# Patient Record
Sex: MALE | Race: WHITE | NOT HISPANIC OR LATINO | Employment: OTHER | URBAN - METROPOLITAN AREA
[De-identification: names, ages, dates, MRNs, and addresses within clinical notes are randomized per-mention and may not be internally consistent; named-entity substitution may affect disease eponyms.]

---

## 2017-10-23 ENCOUNTER — OFFICE VISIT (OUTPATIENT)
Dept: FAMILY MEDICINE CLINIC | Facility: CLINIC | Age: 42
End: 2017-10-23

## 2017-10-23 VITALS
HEART RATE: 72 BPM | DIASTOLIC BLOOD PRESSURE: 88 MMHG | OXYGEN SATURATION: 97 % | BODY MASS INDEX: 36.31 KG/M2 | HEIGHT: 73 IN | SYSTOLIC BLOOD PRESSURE: 140 MMHG | WEIGHT: 274 LBS

## 2017-10-23 DIAGNOSIS — N48.89 EPIDERMOID CYST OF SKIN OF PENIS: Primary | ICD-10-CM

## 2017-10-23 PROCEDURE — 99213 OFFICE O/P EST LOW 20 MIN: CPT | Performed by: NURSE PRACTITIONER

## 2017-10-23 NOTE — PROGRESS NOTES
"Sung Hernandez is a 42 y.o. male.Patient states that he found a lump on his penis about four days ago. The area has decreased in size. The area was red and swollen.It is much better now and he has never had any pain.  Seen 10/23/2017    Assessment/Plan   Problem List Items Addressed This Visit     None      Visit Diagnoses     Epidermoid cyst of skin of penis    -  Primary             No Follow-up on file.  There are no Patient Instructions on file for this visit.    Subjective     Chief Complaint   Patient presents with   • Mass     Social History   Substance Use Topics   • Smoking status: Never Smoker   • Smokeless tobacco: Never Used   • Alcohol use 3.6 oz/week     3 Glasses of wine, 3 Shots of liquor per week      Comment: socially       History of Present Illness     The following portions of the patient's history were reviewed and updated as appropriate:PMHroutine: Social history , Allergies and Current Medications    Review of Systems   Constitutional: Negative for activity change.   Genitourinary: Negative for difficulty urinating, discharge, genital sores, hematuria, penile pain, penile swelling and scrotal swelling.       Objective   Vitals:    10/23/17 1306   BP: 140/88   Pulse: 72   SpO2: 97%   Weight: 274 lb (124 kg)   Height: 73\" (185.4 cm)     Body mass index is 36.15 kg/(m^2).  Physical Exam   Constitutional: He appears well-developed and well-nourished.   HENT:   Head: Normocephalic and atraumatic.   Pulmonary/Chest: Effort normal.   Genitourinary: Penis normal.   Genitourinary Comments: Has a very superfical pink pinpoint dot to glans with no swelling or pain.   Musculoskeletal: Normal range of motion.   Nursing note and vitals reviewed.    Reviewed Data:  No visits with results within 1 Month(s) from this visit.  Latest known visit with results is:    Office Visit on 12/15/2016   Component Date Value Ref Range Status   • Rapid Strep A Screen 12/15/2016 Negative  Negative, VALID, INVALID, Not " Performed Final   • Internal Control 12/15/2016 Passed  Passed Final   • Lot Number 12/15/2016 [id number]   Final   • Expiration Date 12/15/2016 6/2018   Final

## 2018-01-19 ENCOUNTER — OFFICE VISIT (OUTPATIENT)
Dept: FAMILY MEDICINE CLINIC | Facility: CLINIC | Age: 43
End: 2018-01-19

## 2018-01-19 DIAGNOSIS — B02.9 HERPES ZOSTER WITHOUT COMPLICATION: Primary | ICD-10-CM

## 2018-01-19 PROCEDURE — 99213 OFFICE O/P EST LOW 20 MIN: CPT | Performed by: FAMILY MEDICINE

## 2018-01-19 RX ORDER — VALACYCLOVIR HYDROCHLORIDE 1 G/1
1000 TABLET, FILM COATED ORAL 3 TIMES DAILY
Qty: 21 TABLET | Refills: 0 | Status: SHIPPED | OUTPATIENT
Start: 2018-01-19 | End: 2018-01-26

## 2018-01-19 RX ORDER — EPINEPHRINE 0.3 MG/.3ML
INJECTION SUBCUTANEOUS
COMMUNITY
End: 2018-05-25

## 2018-01-19 NOTE — PROGRESS NOTES
Subjective   Sung Hernandez is a 42 y.o. male.     History of Present Illness   Sung is here accompanied by his wife.  He has had itching of his Rt eye and forehead for several days.  He has some swollen glands, headache and neck pain as well.  Rash began about 3 days ago.    The following portions of the patient's history were reviewed and updated as appropriate: allergies, current medications, past medical history, past social history and problem list.    Review of Systems   Skin: Positive for rash.   All other systems reviewed and are negative.      Objective   Physical Exam   Constitutional: He appears well-developed.   Cardiovascular: Normal rate.    Skin:   Papular rash on  Right forehead and right side of scal(   Vitals reviewed.      Assessment/Plan   Diagnoses and all orders for this visit:    Herpes zoster without complication  -     valACYclovir (VALTREX) 1000 MG tablet; Take 1 tablet by mouth 3 (Three) Times a Day for 7 days.    I have advised OTC meds for discomfort and patient handout given.

## 2018-01-19 NOTE — PATIENT INSTRUCTIONS
Shingles  Shingles, which is also known as herpes zoster, is an infection that causes a painful skin rash and fluid-filled blisters. Shingles is not related to genital herpes, which is a sexually transmitted infection.  Shingles only develops in people who:  · Have had chickenpox.  · Have received the chickenpox vaccine. (This is rare.)  What are the causes?  Shingles is caused by varicella-zoster virus (VZV). This is the same virus that causes chickenpox. After exposure to VZV, the virus stays in the body in an inactive (dormant) state. Shingles develops if the virus reactivates. This can happen many years after the initial exposure to VZV. It is not known what causes this virus to reactivate.  What increases the risk?  People who have had chickenpox or received the chickenpox vaccine are at risk for shingles. Infection is more common in people who:  · Are older than age 50.  · Have a weakened defense (immune) system, such as those with HIV, AIDS, or cancer.  · Are taking medicines that weaken the immune system, such as transplant medicines.  · Are under great stress.  What are the signs or symptoms?  Early symptoms of this condition include itching, tingling, and pain in an area on your skin. Pain may be described as burning, stabbing, or throbbing.  A few days or weeks after symptoms start, a painful red rash appears, usually on one side of the body in a bandlike or beltlike pattern. The rash eventually turns into fluid-filled blisters that break open, scab over, and dry up in about 2-3 weeks.  At any time during the infection, you may also develop:  · A fever.  · Chills.  · A headache.  · An upset stomach.  How is this diagnosed?  This condition is diagnosed with a skin exam. Sometimes, skin or fluid samples are taken from the blisters before a diagnosis is made. These samples are examined under a microscope or sent to a lab for testing.  How is this treated?  There is no specific cure for this condition. Your  health care provider will probably prescribe medicines to help you manage pain, recover more quickly, and avoid long-term problems. Medicines may include:  · Antiviral drugs.  · Anti-inflammatory drugs.  · Pain medicines.  If the area involved is on your face, you may be referred to a specialist, such as an eye doctor (ophthalmologist) or an ear, nose, and throat (ENT) doctor to help you avoid eye problems, chronic pain, or disability.  Follow these instructions at home:  Medicines  · Take medicines only as directed by your health care provider.  · Apply an anti-itch or numbing cream to the affected area as directed by your health care provider.  Blister and Rash Care  · Take a cool bath or apply cool compresses to the area of the rash or blisters as directed by your health care provider. This may help with pain and itching.  · Keep your rash covered with a loose bandage (dressing). Wear loose-fitting clothing to help ease the pain of material rubbing against the rash.  · Keep your rash and blisters clean with mild soap and cool water or as directed by your health care provider.  · Check your rash every day for signs of infection. These include redness, swelling, and pain that lasts or increases.  · Do not pick your blisters.  · Do not scratch your rash.  General instructions  · Rest as directed by your health care provider.  · Keep all follow-up visits as directed by your health care provider. This is important.  · Until your blisters scab over, your infection can cause chickenpox in people who have never had it or been vaccinated against it. To prevent this from happening, avoid contact with other people, especially:  ¨ Babies.  ¨ Pregnant women.  ¨ Children who have eczema.  ¨ Elderly people who have transplants.  ¨ People who have chronic illnesses, such as leukemia or AIDS.  Contact a health care provider if:  · Your pain is not relieved with prescribed medicines.  · Your pain does not get better after the rash  heals.  · Your rash looks infected. Signs of infection include redness, swelling, and pain that lasts or increases.  Get help right away if:  · The rash is on your face or nose.  · You have facial pain, pain around your eye area, or loss of feeling on one side of your face.  · You have ear pain or you have ringing in your ear.  · You have loss of taste.  · Your condition gets worse.  This information is not intended to replace advice given to you by your health care provider. Make sure you discuss any questions you have with your health care provider.  Document Released: 12/18/2006 Document Revised: 08/13/2017 Document Reviewed: 10/29/2015  Elsevier Interactive Patient Education © 2017 [employer]

## 2018-05-25 ENCOUNTER — APPOINTMENT (OUTPATIENT)
Dept: MRI IMAGING | Facility: HOSPITAL | Age: 43
End: 2018-05-25

## 2018-05-25 ENCOUNTER — HOSPITAL ENCOUNTER (OUTPATIENT)
Facility: HOSPITAL | Age: 43
Setting detail: OBSERVATION
LOS: 1 days | Discharge: HOME | End: 2018-05-26
Attending: EMERGENCY MEDICINE | Admitting: INTERNAL MEDICINE

## 2018-05-25 ENCOUNTER — APPOINTMENT (OUTPATIENT)
Dept: CT IMAGING | Facility: HOSPITAL | Age: 43
End: 2018-05-25

## 2018-05-25 ENCOUNTER — APPOINTMENT (OUTPATIENT)
Dept: GENERAL RADIOLOGY | Facility: HOSPITAL | Age: 43
End: 2018-05-25

## 2018-05-25 DIAGNOSIS — R26.81 UNSTEADY GAIT: ICD-10-CM

## 2018-05-25 DIAGNOSIS — R07.9 ACUTE CHEST PAIN: Primary | ICD-10-CM

## 2018-05-25 DIAGNOSIS — R13.12 OROPHARYNGEAL DYSPHAGIA: ICD-10-CM

## 2018-05-25 LAB
ALBUMIN SERPL-MCNC: 4.6 G/DL (ref 3.5–5.2)
ALBUMIN/GLOB SERPL: 1.5 G/DL
ALP SERPL-CCNC: 50 U/L (ref 39–117)
ALT SERPL W P-5'-P-CCNC: 23 U/L (ref 1–41)
ANION GAP SERPL CALCULATED.3IONS-SCNC: 10.7 MMOL/L
AST SERPL-CCNC: 22 U/L (ref 1–40)
BASOPHILS # BLD AUTO: 0.02 10*3/MM3 (ref 0–0.2)
BASOPHILS NFR BLD AUTO: 0.3 % (ref 0–1.5)
BILIRUB SERPL-MCNC: 0.7 MG/DL (ref 0.1–1.2)
BUN BLD-MCNC: 17 MG/DL (ref 6–20)
BUN/CREAT SERPL: 15.6 (ref 7–25)
CALCIUM SPEC-SCNC: 10 MG/DL (ref 8.6–10.5)
CHLORIDE SERPL-SCNC: 102 MMOL/L (ref 98–107)
CO2 SERPL-SCNC: 28.3 MMOL/L (ref 22–29)
CREAT BLD-MCNC: 1.09 MG/DL (ref 0.76–1.27)
DEPRECATED RDW RBC AUTO: 42.4 FL (ref 37–54)
EOSINOPHIL # BLD AUTO: 0.11 10*3/MM3 (ref 0–0.7)
EOSINOPHIL NFR BLD AUTO: 1.6 % (ref 0.3–6.2)
ERYTHROCYTE [DISTWIDTH] IN BLOOD BY AUTOMATED COUNT: 12.5 % (ref 11.5–14.5)
GFR SERPL CREATININE-BSD FRML MDRD: 74 ML/MIN/1.73
GLOBULIN UR ELPH-MCNC: 3 GM/DL
GLUCOSE BLD-MCNC: 102 MG/DL (ref 65–99)
HCT VFR BLD AUTO: 48.9 % (ref 40.4–52.2)
HGB BLD-MCNC: 16.7 G/DL (ref 13.7–17.6)
HOLD SPECIMEN: NORMAL
HOLD SPECIMEN: NORMAL
IMM GRANULOCYTES # BLD: 0.03 10*3/MM3 (ref 0–0.03)
IMM GRANULOCYTES NFR BLD: 0.4 % (ref 0–0.5)
LYMPHOCYTES # BLD AUTO: 2.27 10*3/MM3 (ref 0.9–4.8)
LYMPHOCYTES NFR BLD AUTO: 32.9 % (ref 19.6–45.3)
MCH RBC QN AUTO: 31.9 PG (ref 27–32.7)
MCHC RBC AUTO-ENTMCNC: 34.2 G/DL (ref 32.6–36.4)
MCV RBC AUTO: 93.3 FL (ref 79.8–96.2)
MONOCYTES # BLD AUTO: 0.51 10*3/MM3 (ref 0.2–1.2)
MONOCYTES NFR BLD AUTO: 7.4 % (ref 5–12)
NEUTROPHILS # BLD AUTO: 4 10*3/MM3 (ref 1.9–8.1)
NEUTROPHILS NFR BLD AUTO: 57.8 % (ref 42.7–76)
PLATELET # BLD AUTO: 134 10*3/MM3 (ref 140–500)
PMV BLD AUTO: 11.1 FL (ref 6–12)
POTASSIUM BLD-SCNC: 5 MMOL/L (ref 3.5–5.2)
PROT SERPL-MCNC: 7.6 G/DL (ref 6–8.5)
RBC # BLD AUTO: 5.24 10*6/MM3 (ref 4.6–6)
SODIUM BLD-SCNC: 141 MMOL/L (ref 136–145)
TROPONIN T SERPL-MCNC: <0.01 NG/ML (ref 0–0.03)
TROPONIN T SERPL-MCNC: <0.01 NG/ML (ref 0–0.03)
WBC NRBC COR # BLD: 6.91 10*3/MM3 (ref 4.5–10.7)
WHOLE BLOOD HOLD SPECIMEN: NORMAL
WHOLE BLOOD HOLD SPECIMEN: NORMAL

## 2018-05-25 PROCEDURE — G0378 HOSPITAL OBSERVATION PER HR: HCPCS

## 2018-05-25 PROCEDURE — 70544 MR ANGIOGRAPHY HEAD W/O DYE: CPT

## 2018-05-25 PROCEDURE — A9577 INJ MULTIHANCE: HCPCS | Performed by: INTERNAL MEDICINE

## 2018-05-25 PROCEDURE — 85025 COMPLETE CBC W/AUTO DIFF WBC: CPT

## 2018-05-25 PROCEDURE — 70549 MR ANGIOGRAPH NECK W/O&W/DYE: CPT

## 2018-05-25 PROCEDURE — 84484 ASSAY OF TROPONIN QUANT: CPT | Performed by: EMERGENCY MEDICINE

## 2018-05-25 PROCEDURE — 84484 ASSAY OF TROPONIN QUANT: CPT

## 2018-05-25 PROCEDURE — 99285 EMERGENCY DEPT VISIT HI MDM: CPT

## 2018-05-25 PROCEDURE — 93005 ELECTROCARDIOGRAM TRACING: CPT | Performed by: EMERGENCY MEDICINE

## 2018-05-25 PROCEDURE — 36415 COLL VENOUS BLD VENIPUNCTURE: CPT

## 2018-05-25 PROCEDURE — 93005 ELECTROCARDIOGRAM TRACING: CPT

## 2018-05-25 PROCEDURE — 70551 MRI BRAIN STEM W/O DYE: CPT

## 2018-05-25 PROCEDURE — 80053 COMPREHEN METABOLIC PANEL: CPT

## 2018-05-25 PROCEDURE — 93010 ELECTROCARDIOGRAM REPORT: CPT | Performed by: INTERNAL MEDICINE

## 2018-05-25 PROCEDURE — 0 GADOBENATE DIMEGLUMINE 529 MG/ML SOLUTION: Performed by: INTERNAL MEDICINE

## 2018-05-25 PROCEDURE — 71046 X-RAY EXAM CHEST 2 VIEWS: CPT

## 2018-05-25 PROCEDURE — 70450 CT HEAD/BRAIN W/O DYE: CPT

## 2018-05-25 RX ORDER — ACETAMINOPHEN 325 MG/1
650 TABLET ORAL EVERY 4 HOURS PRN
Status: DISCONTINUED | OUTPATIENT
Start: 2018-05-25 | End: 2018-05-26 | Stop reason: HOSPADM

## 2018-05-25 RX ORDER — MULTIVITAMIN WITH IRON
1 TABLET ORAL 2 TIMES DAILY
COMMUNITY

## 2018-05-25 RX ORDER — SODIUM CHLORIDE 9 MG/ML
75 INJECTION, SOLUTION INTRAVENOUS CONTINUOUS
Status: DISCONTINUED | OUTPATIENT
Start: 2018-05-25 | End: 2018-05-26 | Stop reason: HOSPADM

## 2018-05-25 RX ORDER — ONDANSETRON 2 MG/ML
4 INJECTION INTRAMUSCULAR; INTRAVENOUS EVERY 6 HOURS PRN
Status: DISCONTINUED | OUTPATIENT
Start: 2018-05-25 | End: 2018-05-26 | Stop reason: HOSPADM

## 2018-05-25 RX ORDER — SODIUM CHLORIDE 0.9 % (FLUSH) 0.9 %
1-10 SYRINGE (ML) INJECTION AS NEEDED
Status: DISCONTINUED | OUTPATIENT
Start: 2018-05-25 | End: 2018-05-26 | Stop reason: HOSPADM

## 2018-05-25 RX ORDER — ACETAMINOPHEN 650 MG/1
650 SUPPOSITORY RECTAL EVERY 4 HOURS PRN
Status: DISCONTINUED | OUTPATIENT
Start: 2018-05-25 | End: 2018-05-26 | Stop reason: HOSPADM

## 2018-05-25 RX ORDER — ASPIRIN 325 MG
325 TABLET ORAL ONCE
Status: COMPLETED | OUTPATIENT
Start: 2018-05-25 | End: 2018-05-25

## 2018-05-25 RX ORDER — PANTOPRAZOLE SODIUM 40 MG/1
40 TABLET, DELAYED RELEASE ORAL EVERY MORNING
Status: DISCONTINUED | OUTPATIENT
Start: 2018-05-26 | End: 2018-05-26 | Stop reason: HOSPADM

## 2018-05-25 RX ORDER — ACETAMINOPHEN 325 MG/1
650 TABLET ORAL ONCE
Status: COMPLETED | OUTPATIENT
Start: 2018-05-25 | End: 2018-05-25

## 2018-05-25 RX ORDER — CETIRIZINE HYDROCHLORIDE 10 MG/1
10 TABLET ORAL DAILY
Status: DISCONTINUED | OUTPATIENT
Start: 2018-05-26 | End: 2018-05-26 | Stop reason: HOSPADM

## 2018-05-25 RX ORDER — DESVENLAFAXINE SUCCINATE 50 MG/1
50 TABLET, EXTENDED RELEASE ORAL DAILY
Status: DISCONTINUED | OUTPATIENT
Start: 2018-05-26 | End: 2018-05-26 | Stop reason: HOSPADM

## 2018-05-25 RX ORDER — ALBUTEROL SULFATE 2.5 MG/3ML
2.5 SOLUTION RESPIRATORY (INHALATION) EVERY 6 HOURS PRN
Status: DISCONTINUED | OUTPATIENT
Start: 2018-05-25 | End: 2018-05-26 | Stop reason: HOSPADM

## 2018-05-25 RX ORDER — EPINEPHRINE 0.3 MG/.3ML
0.3 INJECTION SUBCUTANEOUS AS NEEDED
COMMUNITY

## 2018-05-25 RX ORDER — ATORVASTATIN CALCIUM 80 MG/1
80 TABLET, FILM COATED ORAL NIGHTLY
Status: DISCONTINUED | OUTPATIENT
Start: 2018-05-25 | End: 2018-05-26 | Stop reason: HOSPADM

## 2018-05-25 RX ORDER — MAGNESIUM OXIDE 400 MG/1
200 TABLET ORAL DAILY
Status: DISCONTINUED | OUTPATIENT
Start: 2018-05-26 | End: 2018-05-26 | Stop reason: HOSPADM

## 2018-05-25 RX ORDER — DESVENLAFAXINE SUCCINATE 50 MG/1
50 TABLET, EXTENDED RELEASE ORAL 2 TIMES DAILY
COMMUNITY

## 2018-05-25 RX ORDER — ASPIRIN 325 MG
325 TABLET ORAL DAILY
Status: DISCONTINUED | OUTPATIENT
Start: 2018-05-26 | End: 2018-05-26 | Stop reason: HOSPADM

## 2018-05-25 RX ORDER — SODIUM CHLORIDE 0.9 % (FLUSH) 0.9 %
10 SYRINGE (ML) INJECTION AS NEEDED
Status: DISCONTINUED | OUTPATIENT
Start: 2018-05-25 | End: 2018-05-26 | Stop reason: HOSPADM

## 2018-05-25 RX ORDER — MONTELUKAST SODIUM 10 MG/1
10 TABLET ORAL NIGHTLY
Status: DISCONTINUED | OUTPATIENT
Start: 2018-05-25 | End: 2018-05-26 | Stop reason: HOSPADM

## 2018-05-25 RX ADMIN — TRAZODONE HYDROCHLORIDE 150 MG: 100 TABLET, FILM COATED ORAL at 23:39

## 2018-05-25 RX ADMIN — ATORVASTATIN CALCIUM 80 MG: 80 TABLET, FILM COATED ORAL at 23:40

## 2018-05-25 RX ADMIN — MONTELUKAST 10 MG: 10 TABLET, FILM COATED ORAL at 23:40

## 2018-05-25 RX ADMIN — SODIUM CHLORIDE 75 ML/HR: 9 INJECTION, SOLUTION INTRAVENOUS at 23:37

## 2018-05-25 RX ADMIN — ASPIRIN 325 MG: 325 TABLET ORAL at 16:08

## 2018-05-25 RX ADMIN — GADOBENATE DIMEGLUMINE 20 ML: 529 INJECTION, SOLUTION INTRAVENOUS at 23:15

## 2018-05-25 RX ADMIN — ACETAMINOPHEN 650 MG: 325 TABLET ORAL at 20:48

## 2018-05-25 NOTE — PROGRESS NOTES
Clinical Pharmacy Services: Medication History    Sung Hernandez is a 43 y.o. male presenting to [facility] for   Chief Complaint   Patient presents with   • Chest Pain     pressure started yest       He  has a past medical history of Acid reflux disease; Anxiety and depression; Asthma; Hearing loss; Hiatal hernia; History of concussion (1994); History of migraine headaches; Low vitamin B12 level; Osteoarthritis of both knees; and Seasonal allergies.    Allergies as of 05/25/2018   • (No Known Allergies)       Medication information was obtained from: Patient  Pharmacy and Phone Number: [phone] [facility]    Prior to Admission Medications     Prescriptions Last Dose Informant Patient Reported? Taking?    desvenlafaxine (PRISTIQ) 50 MG 24 hr tablet 5/25/2018 Self Yes Yes    Take 50 mg by mouth 2 (Two) Times a Day.    EPINEPHrine (EPIPEN) 0.3 MG/0.3ML solution auto-injector injection  Self Yes Yes    Inject 0.3 mg under the skin As Needed.    fexofenadine (ALLEGRA) 180 MG tablet 5/24/2018 Self Yes Yes    Take 180 mg by mouth Every Night.    Magnesium 250 MG tablet  Self Yes Yes    Take 1 tablet by mouth 2 (Two) Times a Day.    montelukast (SINGULAIR) 10 MG tablet 5/24/2018 Self Yes Yes    Take 10 mg by mouth every night.    omeprazole (priLOSEC) 40 MG capsule 5/25/2018 Self Yes Yes    Take 40 mg by mouth Daily.    Potassium 99 MG tablet  Self Yes Yes    Take 1 tablet by mouth daily.    PROAIR  (90 Base) MCG/ACT inhaler  Self Yes Yes    Inhale 2 puffs Every 4 (Four) Hours As Needed.    traZODone (DESYREL) 100 MG tablet 5/24/2018 Self Yes Yes    Take 150 mg by mouth Every Night.            Medication notes: None    This medication list is complete to the best of my knowledge as of 5/25/2018    Please call if questions.  Dandy Muller  5/25/2018 5:54 PM

## 2018-05-25 NOTE — ED PROVIDER NOTES
EMERGENCY DEPARTMENT ENCOUNTER    CHIEF COMPLAINT  Chief Complaint: chest pressure  History given by: patient  History limited by: n/a  Room Number:   PMD: ALYSON Buitrago      HPI:  Pt is a 43 y.o. male who presents complaining of 2 episodes of chest pressure which occurred yesterday and today upon exertion.  Pt also c/o SOA, lightheadedness, and 'off balance' gait with exertion during the episodes.  Pt reports the episode yesterday began after lifting weights and rowing and was constant throughout the night.  Pt reports he was feeling better this morning so he exercised again, which caused another episode this morning around 1115. Pt further describes his 'off balance' gait by reporting unsteadiness with ambulation and 'fine motor skill' issue when attaching a strap to a machine, but states he has been otherwise neurologically intact.  Pt reports FHx of MI at a young age.  Pt denies hx of HTN or hyperlipidemia.  Pt denies use of stimulants.  Pt rates his chest pressure at 1/10 currently.      Duration:  1 day  Onset: gradual  Timin episodes  Location: chest  Radiation: none  Quality: pressure  Intensity/Severity: moderate  Progression: improving  Associated Symptoms: SOA, lightheadedness,unsteady gait  Aggravating Factors: exertion  Alleviating Factors: none stated  Previous Episodes: none stated  Treatment before arrival: none    PAST MEDICAL HISTORY  Active Ambulatory Problems     Diagnosis Date Noted   • Asthma    • Osteoarthritis of both knees    • Hiatal hernia    • Hearing loss    • Acid reflux disease    • Anxiety and depression    • History of migraine headaches    • Low vitamin B12 level    • Seasonal allergies    • Hx of migraines 08/15/2016     Resolved Ambulatory Problems     Diagnosis Date Noted   • No Resolved Ambulatory Problems     Past Medical History:   Diagnosis Date   • Acid reflux disease    • Anxiety and depression    • Asthma    • Hearing loss    • Hiatal hernia    • History of  concussion 1994   • History of migraine headaches    • Low vitamin B12 level    • Osteoarthritis of both knees    • Seasonal allergies        PAST SURGICAL HISTORY  Past Surgical History:   Procedure Laterality Date   • APPENDECTOMY  1994,2006   • GALLBLADDER SURGERY  2006   • KIDNEY STONE SURGERY     • KNEE ARTHROTOMY Right 6/21/2016    Procedure: RT KNEE OPEN REMOVAL OSTEOCHONDROME W/ PERONEAL NERVE DECOMPRESSION;  Surgeon: HENRIETTA Chisholm MD;  Location: [facility] OR [facility] [place];  Service:    • PATELLA SURGERY Left 1989       FAMILY HISTORY  Family History   Problem Relation Age of Onset   • Congenital heart disease Father    • Coronary artery disease Father    • Heart disease Paternal Uncle    • Brain cancer Maternal Grandmother    • Lung cancer Maternal Grandfather    • Heart disease Paternal Grandfather        SOCIAL HISTORY  Social History     Social History   • Marital status:      Spouse name: N/A   • Number of children: N/A   • Years of education: N/A     Occupational History   • Not on file.     Social History Main Topics   • Smoking status: Never Smoker   • Smokeless tobacco: Never Used   • Alcohol use 3.6 oz/week     3 Glasses of wine, 3 Shots of liquor per week      Comment: socially   • Drug use: Yes     Types: Marijuana   • Sexual activity: Defer     Other Topics Concern   • Not on file     Social History Narrative   • No narrative on file       ALLERGIES  Patient has no known allergies.    REVIEW OF SYSTEMS  Review of Systems   Constitutional: Negative for activity change, appetite change and fever.   HENT: Negative for congestion and sore throat.    Eyes: Negative.    Respiratory: Positive for shortness of breath (uponn exertion, 2 episodes). Negative for cough.    Cardiovascular: Positive for chest pain (2 episodes, exertional). Negative for leg swelling.   Gastrointestinal: Negative for abdominal pain, diarrhea and vomiting.   Endocrine: Negative.    Genitourinary: Negative for decreased urine volume  and dysuria.   Musculoskeletal: Negative for neck pain.   Skin: Negative for rash and wound.   Allergic/Immunologic: Negative.    Neurological: Positive for light-headedness (upon exertion, 2 episodes). Negative for weakness, numbness and headaches.        Unsteady gait.   Hematological: Negative.    Psychiatric/Behavioral: Negative.    All other systems reviewed and are negative.      PHYSICAL EXAM  ED Triage Vitals   Temp Heart Rate Resp BP SpO2   05/25/18 1152 05/25/18 1152 05/25/18 1152 05/25/18 1200 05/25/18 1152   97.7 °F (36.5 °C) 97 16 140/83 98 %      Temp src Heart Rate Source Patient Position BP Location FiO2 (%)   05/25/18 1152 05/25/18 1152 -- -- --   Tympanic Monitor          Physical Exam   Constitutional: He is oriented to person, place, and time and well-developed, well-nourished, and in no distress.   HENT:   Head: Normocephalic and atraumatic.   Eyes: EOM are normal. Pupils are equal, round, and reactive to light.   Neck: Normal range of motion. Neck supple.   Cardiovascular: Normal rate, regular rhythm, normal heart sounds and intact distal pulses.    Pulmonary/Chest: Effort normal and breath sounds normal. No respiratory distress.   O2 sat 100% on room air.    Abdominal: Soft. There is no tenderness. There is no rebound and no guarding.   Musculoskeletal: Normal range of motion. He exhibits no edema (BLE).   Neurological: He is alert and oriented to person, place, and time. He has normal sensation and normal strength. Gait normal.   Patient has normal finger to nose bilaterally  His gait is normal with no obvious ataxia. The patient has no drift to upper or lower extremities and normal cranial nerves.  The patient has no sensory changes. Patient's speech is normal without any dysarthria or aphasia.   Skin: Skin is warm and dry.   Psychiatric: Mood and affect normal.   Nursing note and vitals reviewed.      LAB RESULTS  Lab Results (last 24 hours)     Procedure Component Value Units Date/Time     CBC & Differential [[id number]] Collected:  05/25/18 1232    Specimen:  Blood Updated:  05/25/18 1246    Narrative:       The following orders were created for panel order CBC & Differential.  Procedure                               Abnormality         Status                     ---------                               -----------         ------                     CBC Auto Differential[[id number]]        Abnormal            Final result                 Please view results for these tests on the individual orders.    Comprehensive Metabolic Panel [[id number]]  (Abnormal) Collected:  05/25/18 1232    Specimen:  Blood Updated:  05/25/18 1305     Glucose 102 (H) mg/dL      BUN 17 mg/dL      Creatinine 1.09 mg/dL      Sodium 141 mmol/L      Potassium 5.0 mmol/L      Chloride 102 mmol/L      CO2 28.3 mmol/L      Calcium 10.0 mg/dL      Total Protein 7.6 g/dL      Albumin 4.60 g/dL      ALT (SGPT) 23 U/L      AST (SGOT) 22 U/L      Alkaline Phosphatase 50 U/L      Total Bilirubin 0.7 mg/dL      eGFR Non African Amer 74 mL/min/1.73      Globulin 3.0 gm/dL      A/G Ratio 1.5 g/dL      BUN/Creatinine Ratio 15.6     Anion Gap 10.7 mmol/L     Troponin [[id number]]  (Normal) Collected:  05/25/18 1232    Specimen:  Blood Updated:  05/25/18 1305     Troponin T <0.010 ng/mL     Narrative:       Troponin T Reference Ranges:  Less than 0.03 ng/mL:    Negative for AMI  0.03 to 0.09 ng/mL:      Indeterminant for AMI  Greater than 0.09 ng/mL: Positive for AMI    CBC Auto Differential [[id number]]  (Abnormal) Collected:  05/25/18 1232    Specimen:  Blood Updated:  05/25/18 1246     WBC 6.91 10*3/mm3      RBC 5.24 10*6/mm3      Hemoglobin 16.7 g/dL      Hematocrit 48.9 %      MCV 93.3 fL      MCH 31.9 pg      MCHC 34.2 g/dL      RDW 12.5 %      RDW-SD 42.4 fl      MPV 11.1 fL      Platelets 134 (L) 10*3/mm3      Neutrophil % 57.8 %      Lymphocyte % 32.9 %      Monocyte % 7.4 %      Eosinophil % 1.6 %      Basophil % 0.3 %      Immature Grans  % 0.4 %      Neutrophils, Absolute 4.00 10*3/mm3      Lymphocytes, Absolute 2.27 10*3/mm3      Monocytes, Absolute 0.51 10*3/mm3      Eosinophils, Absolute 0.11 10*3/mm3      Basophils, Absolute 0.02 10*3/mm3      Immature Grans, Absolute 0.03 10*3/mm3     Troponin [[id number]]  (Normal) Collected:  05/25/18 1436    Specimen:  Blood Updated:  05/25/18 1506     Troponin T <0.010 ng/mL     Narrative:       Troponin T Reference Ranges:  Less than 0.03 ng/mL:    Negative for AMI  0.03 to 0.09 ng/mL:      Indeterminant for AMI  Greater than 0.09 ng/mL: Positive for AMI          I ordered the above labs and reviewed the results    RADIOLOGY  CT Head Without Contrast   Preliminary Result   No acute process identified. Further evaluation could be   performed with MRI examination of the brain as indicated.               Radiation dose reduction techniques were utilized, including automated   exposure control and exposure modulation based on body size.              XR Chest 2 View   Final Result   No focal pulmonary consolidation. Follow-up as clinical   indications persist.       This report was finalized on 5/25/2018 12:23 PM by Dr. Sung Person M.D.               I ordered the above noted radiological studies. Interpreted by radiologist. Reviewed by me in PACS.       PROCEDURES  Procedures    EKG           EKG time: 1157  Rhythm/Rate: NSR, 77  P waves and CO: normal  QRS, axis: narrow complex, normal axis   ST and T waves: nonspecific changes  No acute process     Interpreted Contemporaneously by me, independently viewed  Unchanged compared to prior.    PROGRESS AND CONSULTS  ED Course as of May 25 1745   Fri May 25, 2018   1523 3:23 PM  The patient's chest pain has been constant since yesterday and has 2 negative troponins  [MM]      ED Course User Index  [MM] Kaveh Coto MD     1203  Ordered ASA for pt's CP.   Ordered CXR, CBC, Troponin, CMP, EKG.     1417  Initial encounter.  Pt is concerned about his unsteady  gait and fine motor issues.  Discussed plan to perform CT Head for further evaluation.  Notified the pt/wife the pt will be admitted.  Pt/wife understands and agrees with the plan, all questions answered.    1419  Ordered Troponin.  Ordered CT Head without contrast for further evaluation of the pt's neuro sx.    1605  Placed call to [facility].    1730  Discussed pt's case with Dr. Eli ([facility]) who agrees to admit the pt. patient is chest pain-freeand in no distress      MEDICAL DECISION MAKING  Results were reviewed/discussed with the patient and they were also made aware of online access. Pt also made aware that some labs, such as cultures, will not be resulted during ER visit and follow up with PMD is necessary.     MDM  Number of Diagnoses or Management Options  Acute chest pain:   Unsteady gait:      Amount and/or Complexity of Data Reviewed  Clinical lab tests: ordered and reviewed (Lab results unremarkable.)  Tests in the radiology section of CPT®: reviewed and ordered (CT Head and CXR show NAD.)  Tests in the medicine section of CPT®: ordered and reviewed (See EKG in procedure note.)  Discuss the patient with other providers: yes (Dr. Eli ([facility]))  Independent visualization of images, tracings, or specimens: yes           DIAGNOSIS  Final diagnoses:   Acute chest pain   Unsteady gait       DISPOSITION  ADMISSION    Discussed treatment plan and reason for admission with pt/family and admitting physician.  Pt/family voiced understanding of the plan for admission for further testing/treatment as needed.       Latest Documented Vital Signs:  As of 5:45 PM  BP- 127/61 HR- 76 Temp- 97.7 °F (36.5 °C) (Tympanic) O2 sat- 98%    --  Documentation assistance provided by yo Jaeger for Dr. Coto.  Information recorded by the yo was done at my direction and has been verified and validated by me.          Leatha Jaeger  05/25/18 7914       Kaveh Coto MD  05/25/18 1734

## 2018-05-25 NOTE — ED NOTES
Attempted to call report. Was told the nurse was with a patient. Informed desk tech that since the room was ready I would call back in 5 minutes and if the RN was not able to take report I would need to speak to a charge nurse.     Thuan Alvarez RN  05/25/18 2716

## 2018-05-26 ENCOUNTER — APPOINTMENT (OUTPATIENT)
Dept: CARDIOLOGY | Facility: HOSPITAL | Age: 43
End: 2018-05-26
Attending: INTERNAL MEDICINE

## 2018-05-26 ENCOUNTER — APPOINTMENT (OUTPATIENT)
Dept: NUCLEAR MEDICINE | Facility: HOSPITAL | Age: 43
End: 2018-05-26

## 2018-05-26 VITALS
OXYGEN SATURATION: 97 % | HEIGHT: 72 IN | TEMPERATURE: 98.8 F | RESPIRATION RATE: 18 BRPM | SYSTOLIC BLOOD PRESSURE: 146 MMHG | DIASTOLIC BLOOD PRESSURE: 93 MMHG | BODY MASS INDEX: 32.23 KG/M2 | WEIGHT: 238 LBS | HEART RATE: 93 BPM

## 2018-05-26 LAB
ALBUMIN SERPL-MCNC: 3.9 G/DL (ref 3.5–5.2)
ALBUMIN/GLOB SERPL: 1.5 G/DL
ALP SERPL-CCNC: 40 U/L (ref 39–117)
ALT SERPL W P-5'-P-CCNC: 20 U/L (ref 1–41)
ANION GAP SERPL CALCULATED.3IONS-SCNC: 12.8 MMOL/L
AORTIC ARCH: 2.3 CM
ASCENDING AORTA: 2.6 CM
AST SERPL-CCNC: 15 U/L (ref 1–40)
BH CV ECHO MEAS - ACS: 2.6 CM
BH CV ECHO MEAS - AO MAX PG (FULL): 3 MMHG
BH CV ECHO MEAS - AO MAX PG: 6 MMHG
BH CV ECHO MEAS - AO MEAN PG (FULL): 1 MMHG
BH CV ECHO MEAS - AO MEAN PG: 3 MMHG
BH CV ECHO MEAS - AO ROOT AREA (BSA CORRECTED): 1.4
BH CV ECHO MEAS - AO ROOT AREA: 8 CM^2
BH CV ECHO MEAS - AO ROOT DIAM: 3.2 CM
BH CV ECHO MEAS - AO V2 MAX: 122 CM/SEC
BH CV ECHO MEAS - AO V2 MEAN: 88.3 CM/SEC
BH CV ECHO MEAS - AO V2 VTI: 24.2 CM
BH CV ECHO MEAS - ASC AORTA: 2.6 CM
BH CV ECHO MEAS - AVA(I,A): 2.8 CM^2
BH CV ECHO MEAS - AVA(I,D): 2.8 CM^2
BH CV ECHO MEAS - AVA(V,A): 2.7 CM^2
BH CV ECHO MEAS - AVA(V,D): 2.7 CM^2
BH CV ECHO MEAS - BSA(HAYCOCK): 2.4 M^2
BH CV ECHO MEAS - BSA: 2.3 M^2
BH CV ECHO MEAS - BZI_BMI: 32.3 KILOGRAMS/M^2
BH CV ECHO MEAS - BZI_METRIC_HEIGHT: 182.9 CM
BH CV ECHO MEAS - BZI_METRIC_WEIGHT: 108 KG
BH CV ECHO MEAS - CONTRAST EF (2CH): 61.6 ML/M^2
BH CV ECHO MEAS - CONTRAST EF 4CH: 62 ML/M^2
BH CV ECHO MEAS - EDV(CUBED): 97.3 ML
BH CV ECHO MEAS - EDV(MOD-SP2): 112 ML
BH CV ECHO MEAS - EDV(MOD-SP4): 100 ML
BH CV ECHO MEAS - EDV(TEICH): 97.3 ML
BH CV ECHO MEAS - EF(CUBED): 77.4 %
BH CV ECHO MEAS - EF(MOD-BP): 61 %
BH CV ECHO MEAS - EF(MOD-SP2): 61.6 %
BH CV ECHO MEAS - EF(MOD-SP4): 62 %
BH CV ECHO MEAS - EF(TEICH): 69.6 %
BH CV ECHO MEAS - ESV(CUBED): 22 ML
BH CV ECHO MEAS - ESV(MOD-SP2): 43 ML
BH CV ECHO MEAS - ESV(MOD-SP4): 38 ML
BH CV ECHO MEAS - ESV(TEICH): 29.6 ML
BH CV ECHO MEAS - FS: 39.1 %
BH CV ECHO MEAS - IVS/LVPW: 1
BH CV ECHO MEAS - IVSD: 1.1 CM
BH CV ECHO MEAS - LAT PEAK E' VEL: 10 CM/SEC
BH CV ECHO MEAS - LV DIASTOLIC VOL/BSA (35-75): 43.6 ML/M^2
BH CV ECHO MEAS - LV MASS(C)D: 181.2 GRAMS
BH CV ECHO MEAS - LV MASS(C)DI: 79 GRAMS/M^2
BH CV ECHO MEAS - LV MAX PG: 3 MMHG
BH CV ECHO MEAS - LV MEAN PG: 2 MMHG
BH CV ECHO MEAS - LV SYSTOLIC VOL/BSA (12-30): 16.6 ML/M^2
BH CV ECHO MEAS - LV V1 MAX: 86.5 CM/SEC
BH CV ECHO MEAS - LV V1 MEAN: 63 CM/SEC
BH CV ECHO MEAS - LV V1 VTI: 17.9 CM
BH CV ECHO MEAS - LVIDD: 4.6 CM
BH CV ECHO MEAS - LVIDS: 2.8 CM
BH CV ECHO MEAS - LVLD AP2: 8.6 CM
BH CV ECHO MEAS - LVLD AP4: 7.7 CM
BH CV ECHO MEAS - LVLS AP2: 7.1 CM
BH CV ECHO MEAS - LVLS AP4: 6.8 CM
BH CV ECHO MEAS - LVOT AREA (M): 3.8 CM^2
BH CV ECHO MEAS - LVOT AREA: 3.8 CM^2
BH CV ECHO MEAS - LVOT DIAM: 2.2 CM
BH CV ECHO MEAS - LVPWD: 1.1 CM
BH CV ECHO MEAS - MED PEAK E' VEL: 7 CM/SEC
BH CV ECHO MEAS - MV A DUR: 0.12 SEC
BH CV ECHO MEAS - MV A MAX VEL: 71.8 CM/SEC
BH CV ECHO MEAS - MV DEC SLOPE: 737 CM/SEC^2
BH CV ECHO MEAS - MV DEC TIME: 0.18 SEC
BH CV ECHO MEAS - MV E MAX VEL: 79.5 CM/SEC
BH CV ECHO MEAS - MV E/A: 1.1
BH CV ECHO MEAS - MV MAX PG: 5.2 MMHG
BH CV ECHO MEAS - MV MEAN PG: 3 MMHG
BH CV ECHO MEAS - MV P1/2T MAX VEL: 110 CM/SEC
BH CV ECHO MEAS - MV P1/2T: 43.7 MSEC
BH CV ECHO MEAS - MV V2 MAX: 114 CM/SEC
BH CV ECHO MEAS - MV V2 MEAN: 75.6 CM/SEC
BH CV ECHO MEAS - MV V2 VTI: 31.6 CM
BH CV ECHO MEAS - MVA P1/2T LCG: 2 CM^2
BH CV ECHO MEAS - MVA(P1/2T): 5 CM^2
BH CV ECHO MEAS - MVA(VTI): 2.2 CM^2
BH CV ECHO MEAS - PA ACC TIME: 0.15 SEC
BH CV ECHO MEAS - PA MAX PG (FULL): 1.2 MMHG
BH CV ECHO MEAS - PA MAX PG: 3 MMHG
BH CV ECHO MEAS - PA PR(ACCEL): 12.4 MMHG
BH CV ECHO MEAS - PA V2 MAX: 86.9 CM/SEC
BH CV ECHO MEAS - PULM A REVS DUR: 0.1 SEC
BH CV ECHO MEAS - PULM A REVS VEL: 45.3 CM/SEC
BH CV ECHO MEAS - PULM DIAS VEL: 38.3 CM/SEC
BH CV ECHO MEAS - PULM S/D: 1.4
BH CV ECHO MEAS - PULM SYS VEL: 54.7 CM/SEC
BH CV ECHO MEAS - PVA(V,A): 3.2 CM^2
BH CV ECHO MEAS - PVA(V,D): 3.2 CM^2
BH CV ECHO MEAS - QP/QS: 0.85
BH CV ECHO MEAS - RAP SYSTOLE: 3 MMHG
BH CV ECHO MEAS - RV MAX PG: 1.8 MMHG
BH CV ECHO MEAS - RV MEAN PG: 1 MMHG
BH CV ECHO MEAS - RV V1 MAX: 67.2 CM/SEC
BH CV ECHO MEAS - RV V1 MEAN: 51.4 CM/SEC
BH CV ECHO MEAS - RV V1 VTI: 13.9 CM
BH CV ECHO MEAS - RVOT AREA: 4.2 CM^2
BH CV ECHO MEAS - RVOT DIAM: 2.3 CM
BH CV ECHO MEAS - RVSP: 15 MMHG
BH CV ECHO MEAS - SI(AO): 84.8 ML/M^2
BH CV ECHO MEAS - SI(CUBED): 32.9 ML/M^2
BH CV ECHO MEAS - SI(LVOT): 29.7 ML/M^2
BH CV ECHO MEAS - SI(MOD-SP2): 30.1 ML/M^2
BH CV ECHO MEAS - SI(MOD-SP4): 27 ML/M^2
BH CV ECHO MEAS - SI(TEICH): 29.6 ML/M^2
BH CV ECHO MEAS - SV(AO): 194.6 ML
BH CV ECHO MEAS - SV(CUBED): 75.4 ML
BH CV ECHO MEAS - SV(LVOT): 68 ML
BH CV ECHO MEAS - SV(MOD-SP2): 69 ML
BH CV ECHO MEAS - SV(MOD-SP4): 62 ML
BH CV ECHO MEAS - SV(RVOT): 57.8 ML
BH CV ECHO MEAS - SV(TEICH): 67.8 ML
BH CV ECHO MEAS - TAPSE (>1.6): 1.94 CM2
BH CV ECHO MEAS - TR MAX VEL: 172 CM/SEC
BH CV ECHO MEASUREMENTS AVERAGE E/E' RATIO: 9.35
BH CV STRESS BP STAGE 1: NORMAL
BH CV STRESS BP STAGE 2: NORMAL
BH CV STRESS BP STAGE 3: NORMAL
BH CV STRESS COMMENTS STAGE 1: NORMAL
BH CV STRESS DOSE REGADENOSON STAGE 1: 0.4
BH CV STRESS DURATION MIN STAGE 1: 3
BH CV STRESS DURATION MIN STAGE 2: 3
BH CV STRESS DURATION MIN STAGE 3: 1
BH CV STRESS DURATION SEC STAGE 1: 0
BH CV STRESS DURATION SEC STAGE 2: 0
BH CV STRESS DURATION SEC STAGE 3: 35
BH CV STRESS GRADE STAGE 1: 10
BH CV STRESS GRADE STAGE 2: 12
BH CV STRESS GRADE STAGE 3: 14
BH CV STRESS HR STAGE 1: 110
BH CV STRESS HR STAGE 2: 140
BH CV STRESS HR STAGE 3: 160
BH CV STRESS METS STAGE 1: 5
BH CV STRESS METS STAGE 2: 7.5
BH CV STRESS METS STAGE 3: 10
BH CV STRESS PROTOCOL 1: NORMAL
BH CV STRESS RECOVERY BP: NORMAL MMHG
BH CV STRESS RECOVERY HR: 90 BPM
BH CV STRESS SPEED STAGE 1: 1.7
BH CV STRESS SPEED STAGE 2: 2.5
BH CV STRESS SPEED STAGE 3: 3.4
BH CV STRESS STAGE 1: 1
BH CV STRESS STAGE 2: 2
BH CV STRESS STAGE 3: 3
BH CV VAS BP RIGHT ARM: NORMAL MMHG
BH CV XLRA - RV BASE: 3.17 CM
BH CV XLRA - TDI S': 19.1 CM/SEC
BILIRUB SERPL-MCNC: 0.6 MG/DL (ref 0.1–1.2)
BUN BLD-MCNC: 14 MG/DL (ref 6–20)
BUN/CREAT SERPL: 16.9 (ref 7–25)
CALCIUM SPEC-SCNC: 9 MG/DL (ref 8.6–10.5)
CHLORIDE SERPL-SCNC: 100 MMOL/L (ref 98–107)
CHOLEST SERPL-MCNC: 147 MG/DL (ref 0–200)
CO2 SERPL-SCNC: 25.2 MMOL/L (ref 22–29)
CREAT BLD-MCNC: 0.83 MG/DL (ref 0.76–1.27)
D DIMER PPP FEU-MCNC: <0.27 MCGFEU/ML (ref 0–0.49)
DEPRECATED RDW RBC AUTO: 42.7 FL (ref 37–54)
ERYTHROCYTE [DISTWIDTH] IN BLOOD BY AUTOMATED COUNT: 12.5 % (ref 11.5–14.5)
GFR SERPL CREATININE-BSD FRML MDRD: 101 ML/MIN/1.73
GLOBULIN UR ELPH-MCNC: 2.6 GM/DL
GLUCOSE BLD-MCNC: 121 MG/DL (ref 65–99)
HBA1C MFR BLD: 4.9 % (ref 4.8–5.6)
HCT VFR BLD AUTO: 45.9 % (ref 40.4–52.2)
HDLC SERPL-MCNC: 36 MG/DL (ref 40–60)
HGB BLD-MCNC: 15.1 G/DL (ref 13.7–17.6)
LDLC SERPL CALC-MCNC: 90 MG/DL (ref 0–100)
LDLC/HDLC SERPL: 2.49 {RATIO}
LEFT ATRIUM VOLUME INDEX: 20 ML/M2
LV EF 2D ECHO EST: 61 %
LV EF NUC BP: 61 %
MAXIMAL PREDICTED HEART RATE: 177 BPM
MAXIMAL PREDICTED HEART RATE: 177 BPM
MCH RBC QN AUTO: 31.3 PG (ref 27–32.7)
MCHC RBC AUTO-ENTMCNC: 32.9 G/DL (ref 32.6–36.4)
MCV RBC AUTO: 95 FL (ref 79.8–96.2)
PERCENT MAX PREDICTED HR: 90.4 %
PLATELET # BLD AUTO: 134 10*3/MM3 (ref 140–500)
PMV BLD AUTO: 11 FL (ref 6–12)
POTASSIUM BLD-SCNC: 4 MMOL/L (ref 3.5–5.2)
PROT SERPL-MCNC: 6.5 G/DL (ref 6–8.5)
RBC # BLD AUTO: 4.83 10*6/MM3 (ref 4.6–6)
SODIUM BLD-SCNC: 138 MMOL/L (ref 136–145)
STRESS BASELINE BP: NORMAL MMHG
STRESS BASELINE HR: 74 BPM
STRESS PERCENT HR: 106 %
STRESS POST ESTIMATED WORKLOAD: 8.8 METS
STRESS POST EXERCISE DUR MIN: 7 MIN
STRESS POST EXERCISE DUR SEC: 35 SEC
STRESS POST PEAK BP: NORMAL MMHG
STRESS POST PEAK HR: 160 BPM
STRESS TARGET HR: 150 BPM
STRESS TARGET HR: 150 BPM
TRIGL SERPL-MCNC: 107 MG/DL (ref 0–150)
VLDLC SERPL-MCNC: 21.4 MG/DL (ref 5–40)
WBC NRBC COR # BLD: 10.78 10*3/MM3 (ref 4.5–10.7)

## 2018-05-26 PROCEDURE — A9500 TC99M SESTAMIBI: HCPCS | Performed by: INTERNAL MEDICINE

## 2018-05-26 PROCEDURE — 80061 LIPID PANEL: CPT | Performed by: INTERNAL MEDICINE

## 2018-05-26 PROCEDURE — 85027 COMPLETE CBC AUTOMATED: CPT | Performed by: INTERNAL MEDICINE

## 2018-05-26 PROCEDURE — 93017 CV STRESS TEST TRACING ONLY: CPT

## 2018-05-26 PROCEDURE — G0378 HOSPITAL OBSERVATION PER HR: HCPCS

## 2018-05-26 PROCEDURE — 25010000002 ONDANSETRON PER 1 MG: Performed by: INTERNAL MEDICINE

## 2018-05-26 PROCEDURE — 93306 TTE W/DOPPLER COMPLETE: CPT | Performed by: INTERNAL MEDICINE

## 2018-05-26 PROCEDURE — 80053 COMPREHEN METABOLIC PANEL: CPT | Performed by: INTERNAL MEDICINE

## 2018-05-26 PROCEDURE — 92610 EVALUATE SWALLOWING FUNCTION: CPT

## 2018-05-26 PROCEDURE — 93016 CV STRESS TEST SUPVJ ONLY: CPT | Performed by: INTERNAL MEDICINE

## 2018-05-26 PROCEDURE — 78452 HT MUSCLE IMAGE SPECT MULT: CPT

## 2018-05-26 PROCEDURE — 93306 TTE W/DOPPLER COMPLETE: CPT

## 2018-05-26 PROCEDURE — 96374 THER/PROPH/DIAG INJ IV PUSH: CPT

## 2018-05-26 PROCEDURE — 99243 OFF/OP CNSLTJ NEW/EST LOW 30: CPT | Performed by: INTERNAL MEDICINE

## 2018-05-26 PROCEDURE — G8996 SWALLOW CURRENT STATUS: HCPCS

## 2018-05-26 PROCEDURE — 83036 HEMOGLOBIN GLYCOSYLATED A1C: CPT | Performed by: INTERNAL MEDICINE

## 2018-05-26 PROCEDURE — G8998 SWALLOW D/C STATUS: HCPCS

## 2018-05-26 PROCEDURE — 78452 HT MUSCLE IMAGE SPECT MULT: CPT | Performed by: INTERNAL MEDICINE

## 2018-05-26 PROCEDURE — 93018 CV STRESS TEST I&R ONLY: CPT | Performed by: INTERNAL MEDICINE

## 2018-05-26 PROCEDURE — 85379 FIBRIN DEGRADATION QUANT: CPT | Performed by: INTERNAL MEDICINE

## 2018-05-26 PROCEDURE — G8997 SWALLOW GOAL STATUS: HCPCS

## 2018-05-26 PROCEDURE — 0 TECHNETIUM SESTAMIBI: Performed by: INTERNAL MEDICINE

## 2018-05-26 RX ADMIN — PANTOPRAZOLE SODIUM 40 MG: 40 TABLET, DELAYED RELEASE ORAL at 05:54

## 2018-05-26 RX ADMIN — ACETAMINOPHEN 650 MG: 325 TABLET ORAL at 02:04

## 2018-05-26 RX ADMIN — CETIRIZINE HYDROCHLORIDE 10 MG: 10 TABLET, FILM COATED ORAL at 08:37

## 2018-05-26 RX ADMIN — ONDANSETRON 4 MG: 2 INJECTION INTRAMUSCULAR; INTRAVENOUS at 02:23

## 2018-05-26 RX ADMIN — ASPIRIN 325 MG: 325 TABLET ORAL at 08:37

## 2018-05-26 RX ADMIN — TECHNETIUM TC 99M SESTAMIBI 1 DOSE: 1 INJECTION INTRAVENOUS at 12:08

## 2018-05-26 RX ADMIN — DESVENLAFAXINE SUCCINATE 50 MG: 50 TABLET, EXTENDED RELEASE ORAL at 08:37

## 2018-05-26 RX ADMIN — Medication 200 MG: at 08:37

## 2018-05-26 RX ADMIN — TECHNETIUM TC 99M SESTAMIBI 1 DOSE: 1 INJECTION INTRAVENOUS at 10:40

## 2018-05-26 NOTE — H&P
Internal medicine history and physical    INTERNAL MEDICINE   [facility]       Patient Identification:  Name: Sung Hernandez  Age: 43 y.o.  Sex: male  :  1975  MRN: [id number]                   Primary Care Physician: ALYSON Buitrago                                   Chief Complaint:  Significant tightness in the chest and extreme fatigue within an hour of work out activity became accustomed to for the last 5-6 years starting .    History of Present Illness:   Patient is a 43-year-old male with past medical history remarkable for vitamin deficiency anxiety and depression reflux disease and seasonal allergies has been otherwise in good health until  when he went to his routine workout to the gym.  He performed his exercise routines but within an hour felt very exhausted and tired and had to stop.  Soon afterwards he was having discomfort and tightness in his chest going up to his neck.  He did not finish his exercise routine went home and started feeling somewhat better.  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  He went to bed with tightness in his chest and then woke up this morning felt better.  Due to his routines went back to the gym around noon and developed similar discomfort but this time was associated with unsteadiness and felt like he has loss of equilibrium.  Because of the chest discomfort and overall he felt and the clammy and sweaty feeling he had she will brought to the emergency room for further evaluation.  Patient denies any difficulty in thinking distending except for couple hours yesterday afternoon when he was feeling foggy and uneventful think clearly.  Today his discomfort in the chest lasted from 11:00 - 11:30 to 4:00.      Past Medical History:  Past Medical History:   Diagnosis Date   • Acid reflux disease    • Anxiety and depression    • Asthma    • Hearing loss    • Hiatal hernia     • History of concussion 1994   • History of migraine headaches    • Low vitamin B12 level    • Osteoarthritis of both knees    • Seasonal allergies      Past Surgical History:  Past Surgical History:   Procedure Laterality Date   • APPENDECTOMY  1994,2006   • GALLBLADDER SURGERY  2006   • KIDNEY STONE SURGERY     • KNEE ARTHROTOMY Right 6/21/2016    Procedure: RT KNEE OPEN REMOVAL OSTEOCHONDROME W/ PERONEAL NERVE DECOMPRESSION;  Surgeon: HENRIETTA Chisholm MD;  Location: [facility] [place];  Service:    • PATELLA SURGERY Left 1989      Home Meds:  Prescriptions Prior to Admission   Medication Sig Dispense Refill Last Dose   • desvenlafaxine (PRISTIQ) 50 MG 24 hr tablet Take 50 mg by mouth 2 (Two) Times a Day.   5/25/2018 at Unknown time   • EPINEPHrine (EPIPEN) 0.3 MG/0.3ML solution auto-injector injection Inject 0.3 mg under the skin As Needed.      • fexofenadine (ALLEGRA) 180 MG tablet Take 180 mg by mouth Every Night.   5/24/2018 at Unknown time   • Magnesium 250 MG tablet Take 1 tablet by mouth 2 (Two) Times a Day.      • montelukast (SINGULAIR) 10 MG tablet Take 10 mg by mouth every night.   5/24/2018 at Unknown time   • omeprazole (priLOSEC) 40 MG capsule Take 40 mg by mouth Daily.   5/25/2018 at Unknown time   • Potassium 99 MG tablet Take 1 tablet by mouth daily.   Taking   • PROAIR  (90 Base) MCG/ACT inhaler Inhale 2 puffs Every 4 (Four) Hours As Needed.      • traZODone (DESYREL) 100 MG tablet Take 150 mg by mouth Every Night.   5/24/2018 at Unknown time     Current Meds:     Current Facility-Administered Medications:   •  sodium chloride 0.9 % flush 10 mL, 10 mL, Intravenous, PRN, Kaveh Coto MD  Allergies:  No Known Allergies  Social History:   Social History   Substance Use Topics   • Smoking status: Never Smoker   • Smokeless tobacco: Never Used   • Alcohol use 3.6 oz/week     3 Glasses of wine, 3 Shots of liquor per week      Comment: socially      Family History:  Family History   Problem  "Relation Age of Onset   • Congenital heart disease Father    • Coronary artery disease Father    • Heart disease Paternal Uncle    • Brain cancer Maternal Grandmother    • Lung cancer Maternal Grandfather    • Heart disease Paternal Grandfather           Review of Systems  See history of present illness and past medical history.   Constitutional: Negative for activity change, appetite change and fever.   HENT: Negative for congestion and sore throat.    Eyes: Negative.    Respiratory: Positive for shortness of breath (uponn exertion, 2 episodes). Negative for cough.    Cardiovascular: Positive for chest pain (2 episodes, exertional). Negative for leg swelling.   Gastrointestinal: Negative for abdominal pain, diarrhea and vomiting.   Endocrine: Negative.    Genitourinary: Negative for decreased urine volume and dysuria.   Musculoskeletal: Negative for neck pain.   Skin: Negative for rash and wound.   Allergic/Immunologic: Negative.    Neurological: Positive for light-headedness (upon exertion, 2 episodes). Negative for weakness, numbness and headaches.        Unsteady gait.   Hematological: Negative.    Psychiatric/Behavioral: Negative.    Vitals:   /91 (BP Location: Left arm, Patient Position: Lying)   Pulse 76   Temp 98.8 °F (37.1 °C) (Oral)   Resp 16   Ht 185.4 cm (73\")   Wt 108 kg (239 lb)   SpO2 98%   BMI 31.53 kg/m²   I/O: No intake or output data in the 24 hours ending 05/25/18 2105  Exam:  General Appearance:    Alert, cooperative, no distress, appears stated age   Head:    Normocephalic, without obvious abnormality, atraumatic   Eyes:    PERRL, conjunctiva/corneas clear, EOM's intact, both eyes   Ears:    Normal external ear canals, both ears   Nose:   Nares normal, septum midline, mucosa normal, no drainage    or sinus tenderness   Throat:   Lips, tongue, gums normal; oral mucosa pink and moist   Neck:   Supple, symmetrical, trachea midline, no adenopathy;     thyroid:  no " enlargement/tenderness/nodules; no carotid    bruit or JVD   Back:     Symmetric, no curvature, ROM normal, no CVA tenderness   Lungs:     Clear to auscultation bilaterally, respirations unlabored   Chest Wall:    No tenderness or deformity    Heart:    Regular rate and rhythm, S1 and S2 normal, no murmur, rub   or gallop   Abdomen:     Soft, non-tender, bowel sounds active all four quadrants,     no masses, no hepatomegaly, no splenomegaly   Extremities:   Extremities normal, atraumatic, no cyanosis or edema   Pulses:   Pulses palpable in all extremities; symmetric all extremities   Skin:   Skin color normal, Skin is warm and dry,  no rashes or palpable lesions   Neurologic:   CNII-XII intact, motor strength grossly intact, sensation grossly intact to light touch, no focal deficits noted       Data Review:      I reviewed the patient's new clinical results.    Results from last 7 days  Lab Units 05/25/18  1232   WBC 10*3/mm3 6.91   HEMOGLOBIN g/dL 16.7   PLATELETS 10*3/mm3 134*       Results from last 7 days  Lab Units 05/25/18  1232   SODIUM mmol/L 141   POTASSIUM mmol/L 5.0   CHLORIDE mmol/L 102   CO2 mmol/L 28.3   BUN mg/dL 17   CREATININE mg/dL 1.09   CALCIUM mg/dL 10.0   GLUCOSE mg/dL 102*     Xr Chest 2 View    Result Date: 5/25/2018  No focal pulmonary consolidation. Follow-up as clinical indications persist.  This report was finalized on 5/25/2018 12:23 PM by Dr. Sung Person M.D.      Ct Head Without Contrast    Result Date: 5/25/2018  No acute process identified. Further evaluation could be performed with MRI examination of the brain as indicated.    Radiation dose reduction techniques were utilized, including automated exposure control and exposure modulation based on body size.       ECG 12 Lead   Final Result   RR Interval= 779 ms   IN Interval= 128 ms   QRSD Interval= 98 ms   QT Interval= 364 ms   QTc Interval= 412 ms   Heart Rate= 77 ms   P Axis= 27 deg   QRS Axis= 28 deg   T Wave Axis= 11 deg    I: 40 Axis= 26 deg   T: 40 Axis= 89 deg   ST Axis= 14 deg   SINUS RHYTHM   NO SIGNIFICANT CHANGE FROM PREVIOUS ECG   Electronically Signed by:  Lynette Jo ([facility]) 25-May-2018 17:30:39   Date and Time of Study: 2018-05-25 11:57:08            Assessment:  Active Hospital Problems (** Indicates Principal Problem)    Diagnosis Date Noted   • **Acute chest pain [R07.9] 05/25/2018   • History of migraine headaches [Z86.69]    • Low vitamin B12 level [E53.8]    • Anxiety and depression [F41.8]    • Asthma [J45.909]    • Acid reflux disease [K21.9]       Resolved Hospital Problems    Diagnosis Date Noted Date Resolved   No resolved problems to display.       Plan:  Recurrent chest tightness with associated sweating but no nausea and no radiation to arm in the context of family history of premature coronary artery disease and myocardial infarctions on his father's side-rule out underlying coronary artery disease and acute coronary syndrome.  Plan is to check serial troponin EKG and if everything is normal consider cardiology evaluation and decision to pursue evaluation of this chest pain with stress test or go for heart catheterization.  Subjective unsteadiness/ataxia upon ambulation since the onset of discomfort this afternoon and yesterday with no obvious focal neurological deficits and negative CT scan.  Plan is to check MRI to rule out posterior circulation CVA and neurology evaluation.  Anxiety and depression on more than usual dose of Pristiq.  Plan is to continue at the regular dose while he is in the hospital.  Gastroesophageal reflux disease plan is to continue his Prilosec/Protonix.  Underlying asthma/reactive airway disease continue his inhalers.    Sima Eli MD   5/25/2018  9:05 PM  Much of this encounter note is an electronic transcription/translation of spoken language to printed text. The electronic translation of spoken language may permit erroneous, or at times, nonsensical words or phrases to be  inadvertently transcribed; Although I have reviewed the note for such errors, some may still exist

## 2018-05-26 NOTE — CONSULTS
Patient Name: Sung Hernandez  :1975  43 y.o.    Date of Admission: 2018  Date of Consultation:  18  Encounter Provider: Yaw Crow III, MD  Place of Service: [facility] CARDIOLOGY  Referring Provider: Sima Eli MD  Patient Care Team:  ALYSON Ellington as PCP - General (Family Medicine)      Chief complaint: Chest Pressure     History of Present Illness:   Mr. Sung Hernandez is a 43 year old male patient with a history of asthma, hiatal hernia, anxiety, depression and acid reflux.      To the emergency room with complaint of mid precordial chest discomfort with exertion.  He also felt short of breath and had some lightheadedness.  He felt a little unsteady.  States the first episode occurred 2 days ago when he was lifting weights and rowing in any of the discomfort pretty constantly throughout the night.  He felt better the next day and decided at again began to feel unsteady and off balance.  He also had some chest discomfort.  He was admitted for further evaluation and treatment.    Patient states he never really severe headache overnight.  He has at times still felt uncomfortable.  He has not had any chest discomfort.  MRI/MRA is ordered and is pending.    This patient has no known cardiac history.  This patient has no history of coronary artery disease, congestive heart failure, rheumatic fever, rheumatic heart disease, congenital heart disease or heart murmur.  This patient has never required invasive cardiovascular evaluation.    He denies any sensation of palpitations or heart racing.  No presyncope or syncope.  No orthopnea or PND.      Past Medical History:   Diagnosis Date   • Acid reflux disease    • Anxiety and depression    • Asthma    • Hearing loss    • Hiatal hernia    • History of concussion    • History of migraine headaches    • Low vitamin B12 level    • Osteoarthritis of both knees    • Seasonal allergies        Past Surgical  History:   Procedure Laterality Date   • APPENDECTOMY  1994,2006   • GALLBLADDER SURGERY  2006   • KIDNEY STONE SURGERY     • KNEE ARTHROTOMY Right 6/21/2016    Procedure: RT KNEE OPEN REMOVAL OSTEOCHONDROME W/ PERONEAL NERVE DECOMPRESSION;  Surgeon: HENRIETTA Chisholm MD;  [place] [facility] [place] [facility] [place]:    • PATELLA SURGERY Left 1989         Prior to Admission medications    Medication Sig Start Date End Date Taking? Authorizing Provider   desvenlafaxine (PRISTIQ) 50 MG 24 hr tablet Take 50 mg by mouth 2 (Two) Times a Day.   Yes Historical Provider, MD   EPINEPHrine (EPIPEN) 0.3 MG/0.3ML solution auto-injector injection Inject 0.3 mg under the skin As Needed.   Yes Historical Provider, MD   fexofenadine (ALLEGRA) 180 MG tablet Take 180 mg by mouth Every Night.   Yes Historical Provider, MD   Magnesium 250 MG tablet Take 1 tablet by mouth 2 (Two) Times a Day.   Yes Historical Provider, MD   montelukast (SINGULAIR) 10 MG tablet Take 10 mg by mouth every night.   Yes Historical Provider, MD   omeprazole (priLOSEC) 40 MG capsule Take 40 mg by mouth Daily. 11/28/16  Yes Historical Provider, MD   Potassium 99 MG tablet Take 1 tablet by mouth daily.   Yes Historical Provider, MD   PROAIR  (90 Base) MCG/ACT inhaler Inhale 2 puffs Every 4 (Four) Hours As Needed. 11/6/17  Yes Historical Provider, MD   traZODone (DESYREL) 100 MG tablet Take 150 mg by mouth Every Night. 12/4/16  Yes Historical Provider, MD       No Known Allergies    Social History     Social History   • Marital status:      Social History Main Topics   • Smoking status: Never Smoker   • Smokeless tobacco: Never Used   • Alcohol use 3.6 oz/week     3 Glasses of wine, 3 Shots of liquor per week      Comment: socially   • Drug use: Yes     Types: Marijuana   • Sexual activity: Defer     Other Topics Concern   • Not on file       Family History   Problem Relation Age of Onset   • Congenital heart disease Father    • Coronary artery disease Father     • Heart disease Paternal Uncle    • Brain cancer Maternal Grandmother    • Lung cancer Maternal Grandfather    • Heart disease Paternal Grandfather        REVIEW OF SYSTEMS:   All systems reviewed.  Pertinent positives identified in HPI.  All other systems are negative.      Objective:     Vitals:    05/25/18 1647 05/25/18 1747 05/25/18 1925 05/25/18 2342   BP: 127/61 127/75 146/91 141/88   BP Location:   Left arm Left arm   Patient Position:   Lying Lying   Pulse: 76 78 76 75   Resp:   16 16   Temp:   98.8 °F (37.1 °C) 98.2 °F (36.8 °C)   TempSrc:   Oral Oral   SpO2: 98% 98% 98%    Weight:   108 kg (239 lb)    Height:         Body mass index is 31.53 kg/m².    Physical Exam:  General Appearance:    Alert, cooperative, in no acute distress   Head:    Normocephalic, without obvious abnormality, atraumatic   Eyes:            Lids and lashes normal, conjunctivae and sclerae normal, no   icterus, no pallor, corneas clear, PERRLA   Ears:    Ears appear intact with no abnormalities noted   Throat:   No oral lesions, no thrush, oral mucosa moist   Neck:   No adenopathy, supple, trachea midline, no thyromegaly, no   carotid bruit, no JVD   Back:     No kyphosis present, no scoliosis present, no skin lesions, erythema or scars, no tenderness to percussion or palpation, range of motion normal   Lungs:     Clear to auscultation,respirations regular, even and unlabored    Heart:    Regular rhythm and normal rate, normal S1 and S2, no murmur, no gallop, no rub, no click   Chest Wall:    No abnormalities observed   Abdomen:     Normal bowel sounds, no masses, no organomegaly, soft        non-tender, non-distended, no guarding, no rebound  tenderness   Extremities:   Moves all extremities well, no edema, no cyanosis, no redness   Pulses:   Pulses palpable and equal bilaterally. Normal radial, carotid, femoral, dorsalis pedis and posterior tibial pulses bilaterally. Normal abdominal aorta   Skin:  Psychiatric:   No bleeding,  bruising or rash    Alert and oriented x 3, normal mood and affect         Lab Review:       Results from last 7 days  Lab Units 05/26/18  0405   SODIUM mmol/L 138   POTASSIUM mmol/L 4.0   CHLORIDE mmol/L 100   CO2 mmol/L 25.2   BUN mg/dL 14   CREATININE mg/dL 0.83   CALCIUM mg/dL 9.0   BILIRUBIN mg/dL 0.6   ALK PHOS U/L 40   ALT (SGPT) U/L 20   AST (SGOT) U/L 15   GLUCOSE mg/dL 121*       Results from last 7 days  Lab Units 05/25/18  1436 05/25/18  1232   TROPONIN T ng/mL <0.010 <0.010       Results from last 7 days  Lab Units 05/26/18  0405   WBC 10*3/mm3 10.78*   HEMOGLOBIN g/dL 15.1   HEMATOCRIT % 45.9   PLATELETS 10*3/mm3 134*               Results from last 7 days  Lab Units 05/26/18  0405   CHOLESTEROL mg/dL 147   TRIGLYCERIDES mg/dL 107   HDL CHOL mg/dL 36*   LDL CHOL mg/dL 90       EKG 5/25/2018          I personally viewed and interpreted the patient's EKG/Telemetry data.      Current Facility-Administered Medications:   •  acetaminophen (TYLENOL) tablet 650 mg, 650 mg, Oral, Q4H PRN, 650 mg at 05/26/18 0204 **OR** acetaminophen (TYLENOL) suppository 650 mg, 650 mg, Rectal, Q4H PRN, Sima Eli MD  •  albuterol (PROVENTIL) nebulizer solution 0.083% 2.5 mg/3mL, 2.5 mg, Nebulization, Q6H PRN, Sima Eli MD  •  aspirin tablet 325 mg, 325 mg, Oral, Daily, Sima Eli MD, 325 mg at 05/26/18 0837  •  atorvastatin (LIPITOR) tablet 80 mg, 80 mg, Oral, Nightly, Sima Eli MD, 80 mg at 05/25/18 2340  •  cetirizine (zyrTEC) tablet 10 mg, 10 mg, Oral, Daily, Sima Eli MD, 10 mg at 05/26/18 0837  •  desvenlafaxine (PRISTIQ) 24 hr tablet 50 mg, 50 mg, Oral, Daily, Sima Eli MD, 50 mg at 05/26/18 0837  •  gadobenate dimeglumine (MULTIHANCE) injection 20 mL, 20 mL, Intravenous, Once in imaging, Jawed MD Sreedhar  •  magnesium oxide (MAG-OX) tablet 200 mg, 200 mg, Oral, Daily, Jawed MD Sreedhar, 200 mg at 05/26/18 0837  •  montelukast (SINGULAIR) tablet 10 mg, 10 mg, Oral, Nightly, Jawed MD Sreedhar, 10 mg at  05/25/18 2340  •  ondansetron (ZOFRAN) injection 4 mg, 4 mg, Intravenous, Q6H PRN, Sima Eli MD, 4 mg at 05/26/18 0223  •  pantoprazole (PROTONIX) EC tablet 40 mg, 40 mg, Oral, QAM, Sima Eli MD, 40 mg at 05/26/18 0554  •  sodium chloride 0.9 % flush 1-10 mL, 1-10 mL, Intravenous, PRN, Sima Eli MD  •  sodium chloride 0.9 % flush 1-10 mL, 1-10 mL, Intravenous, PRN, Sima Eli MD  •  sodium chloride 0.9 % flush 10 mL, 10 mL, Intravenous, PRN, Kaveh Coto MD  •  sodium chloride 0.9 % infusion, 75 mL/hr, Intravenous, Continuous, Sima Eli MD, Last Rate: 75 mL/hr at 05/25/18 2337, 75 mL/hr at 05/25/18 2337  •  traZODone (DESYREL) tablet 150 mg, 150 mg, Oral, Nightly, Sima Eli MD, 150 mg at 05/25/18 2339    Assessment and Plan:       Active Hospital Problems (** Indicates Principal Problem)    Diagnosis Date Noted   • **Acute chest pain [R07.9] 05/25/2018   • History of migraine headaches [Z86.69]    • Low vitamin B12 level [E53.8]    • Anxiety and depression [F41.8]    • Asthma [J45.909]    • Acid reflux disease [K21.9]       Resolved Hospital Problems    Diagnosis Date Noted Date Resolved   No resolved problems to display.     1.  Chest discomfort-with both typical and atypical components.  EKG and troponin are unremarkable, we'll arrange for a stress Cardiolite.  An echocardiogram is also been ordered and will be reviewed once available.    ADDENDUM- stress and echo care normal. No evidence of cardiac issues, will sign off.  Yaw Crow III, MD  05/26/18  8:38 AM

## 2018-05-26 NOTE — THERAPY EVALUATION
Acute Care - Speech Language Pathology   Swallow Initial Evaluation  [facility]     Patient Name: Sung Hernandez  : 1975  MRN: [id number]  Today's Date: 2018               Admit Date: 2018    Visit Dx:     ICD-10-CM ICD-9-CM   1. Acute chest pain R07.9 786.50   2. Unsteady gait R26.81 781.2   3. Oropharyngeal dysphagia R13.12 787.22     Patient Active Problem List   Diagnosis   • Asthma   • Osteoarthritis of both knees   • Hiatal hernia   • Hearing loss   • Acid reflux disease   • Anxiety and depression   • History of migraine headaches   • Low vitamin B12 level   • Seasonal allergies   • Hx of migraines   • Acute chest pain     Past Medical History:   Diagnosis Date   • Acid reflux disease    • Anxiety and depression    • Asthma    • Hearing loss    • Hiatal hernia    • History of concussion    • History of migraine headaches    • Low vitamin B12 level    • Osteoarthritis of both knees    • Seasonal allergies      Past Surgical History:   Procedure Laterality Date   • APPENDECTOMY  ,   • GALLBLADDER SURGERY     • KIDNEY STONE SURGERY     • KNEE ARTHROTOMY Right 2016    Procedure: RT KNEE OPEN REMOVAL OSTEOCHONDROME W/ PERONEAL NERVE DECOMPRESSION;  Surgeon: HENRIETTA Chisholm MD;  [place] [facility] [place] [facility] [place]  Service:    • PATELLA SURGERY Left           SWALLOW EVALUATION (last 72 hours)      SLP Adult Swallow Evaluation     Row Name 18 1500                   Rehab Evaluation    Document Type evaluation  -LS        Patient Effort excellent  -LS           General Information    Patient Profile Reviewed yes  -LS        Pertinent History Of Current Problem --   acute chest pains  -LS        Current Method of Nutrition regular textures;thin liquids  -LS        Precautions/Limitations, Vision WFL  -LS        Precautions/Limitations, Hearing WFL  -LS        Prior Level of Function-Communication WFL  -LS        Prior Level of Function-Swallowing no diet consistency restrictions   -        Plans/Goals Discussed with patient  -        Barriers to Rehab none identified  -        Patient's Goals for Discharge return home  -LS           General Eating/Swallowing Observations    Eating/Swallowing Skills self-fed  -LS        Positioning During Eating upright 90 degree;upright in bed  -        Utensils Used spoon;cup  -LS           Respiratory    Respiratory Status WFL;room air  -           Clinical Swallow Eval    Oral Prep Phase WFL  -LS        Oral Transit WFL  -LS        Oral Residue WFL  -LS        Pharyngeal Phase no overt signs/symptoms of pharyngeal impairment  -LS        Esophageal Phase unremarkable  -LS           Clinical Impression    SLP Swallowing Diagnosis functional oral phase;functional pharyngeal phase  -        Functional Impact no impact on function  -        Criteria for Skilled Therapeutic Interventions Met no problems identified which require skilled intervention  -           Recommendations    Therapy Frequency (Swallow) PRN  -LS        Predicted Duration Therapy Intervention (Days) until discharge  -        SLP Diet Recommendation regular textures;thin liquids  -        Recommended Precautions and Strategies upright posture during/after eating  -        SLP Rec. for Method of Medication Administration meds whole  -LS        Anticipated Dischage Disposition home  -          User Key  (r) = Recorded By, (t) = Taken By, (c) = Cosigned By    Initials Name Effective Dates     Zohaib Denton MS CCC-SLP 03/07/18 -         EDUCATION  The patient has been educated in the following areas:   Dysphagia (Swallowing Impairment).    SLP Recommendation and Plan  SLP Swallowing Diagnosis: functional oral phase, functional pharyngeal phase  SLP Diet Recommendation: regular textures, thin liquids  Recommended Precautions and Strategies: upright posture during/after eating           Criteria for Skilled Therapeutic Interventions Met: no problems identified which  require skilled intervention  Anticipated Dischage Disposition: home     Therapy Frequency (Swallow): PRN  Predicted Duration Therapy Intervention (Days): until discharge       Plan of Care Reviewed With: patient  Plan of Care Review  Plan of Care Reviewed With: patient  Outcome Summary: Pt exhibits a functional swallow           SLP Outcome Measures (last 72 hours)      SLP Outcome Measures     Row Name 05/26/18 1500             SLP Outcome Measures    Outcome Measure Used? Adult NOMS  -LS         FCM Scores    FCM Chosen Swallowing  -LS      Swallowing FCM Score 7  -LS        User Key  (r) = Recorded By, (t) = Taken By, (c) = Cosigned By    Initials Name Effective Dates    LS Zohaib Denton MS CCC-FRANKLIN 03/07/18 -            Time Calculation:       Therapy Charges for Today     Code Description Service Date Service Provider Modifiers Qty    82691922257 HC ST SWALLOWING CURRENT STATUS 5/26/2018 Zohaib Denton MS CCC-SLP GN, CH 1    67509710184 HC ST SWALLOWING PROJECTED 5/26/2018 Zohaib Denton MS CCC-SLP GN, CH 1    41464552852 HC ST SWALLOWING DISCHARGE 5/26/2018 Zohaib Denton MS CCC-FRANKLIN GN, CH 1    51639859328 HC ST EVAL ORAL PHARYNG SWALLOW 3 5/26/2018 Zohaib Denton MS CCC-FRANKLIN GN, KX 1          SLP G-Codes  SLP NOMS Used?: Yes  Functional Limitations: Swallowing  Swallow Current Status (): 0 percent impaired, limited or restricted  Swallow Goal Status (): 0 percent impaired, limited or restricted  Swallow Discharge Status (): 0 percent impaired, limited or restricted    MS ROLLY Burgos  5/26/2018

## 2018-05-26 NOTE — PROGRESS NOTES
Name: Sung Hernandez ADMIT: 2018   : 1975  PCP: ALYSON Buitrago    MRN: [id number] LOS: 1 days   AGE/SEX: 43 y.o. male  ROOM: [facility]   Subjective   Feels fine today.  Chest pain and discomfort has resolved, no shortness of breath or cough  Mild headache and nausea overnight this has rales resolved  No abdominal pain diarrhea, fever or rash    Objective   Vital Signs  Temp:  [97.7 °F (36.5 °C)-98.8 °F (37.1 °C)] 98.2 °F (36.8 °C)  Heart Rate:  [63-97] 75  Resp:  [16] 16  BP: (123-146)/(61-91) 141/88  SpO2:  [94 %-98 %] 98 %  on   ;   Device (Oxygen Therapy): room air  Body mass index is 31.53 kg/m².    Physical Exam   Constitutional: He is oriented to person, place, and time. No distress.   HENT:   Head: Normocephalic and atraumatic.   Neck: No JVD present.   Cardiovascular: Normal rate and regular rhythm.    No murmur heard.  Pulmonary/Chest: Effort normal and breath sounds normal. No stridor. No respiratory distress.   Abdominal: Soft. Bowel sounds are normal. He exhibits no distension. There is no tenderness.   Musculoskeletal: He exhibits no edema or tenderness.   Neurological: He is alert and oriented to person, place, and time.   Skin: Skin is warm and dry. He is not diaphoretic. No erythema.   Psychiatric: He has a normal mood and affect. His behavior is normal.   Vitals reviewed.      Results Review:       I reviewed the patient's new clinical results.    Results from last 7 days  Lab Units 18  0405 18  1232   WBC 10*3/mm3 10.78* 6.91   HEMOGLOBIN g/dL 15.1 16.7   PLATELETS 10*3/mm3 134* 134*     Results from last 7 days  Lab Units 18  0405 18  1232   SODIUM mmol/L 138 141   POTASSIUM mmol/L 4.0 5.0   CHLORIDE mmol/L 100 102   CO2 mmol/L 25.2 28.3   BUN mg/dL 14 17   CREATININE mg/dL 0.83 1.09   GLUCOSE mg/dL 121* 102*   Estimated Creatinine Clearance: 147.9 mL/min (by C-G formula based on SCr of 0.83 mg/dL).  Results from last 7 days  Lab Units 18  5084  05/25/18  1232   CALCIUM mg/dL 9.0 10.0   ALBUMIN g/dL 3.90 4.60         aspirin 325 mg Oral Daily   atorvastatin 80 mg Oral Nightly   cetirizine 10 mg Oral Daily   desvenlafaxine 50 mg Oral Daily   gadobenate dimeglumine 20 mL Intravenous Once in imaging   magnesium oxide 200 mg Oral Daily   montelukast 10 mg Oral Nightly   pantoprazole 40 mg Oral QAM   traZODone 150 mg Oral Nightly       sodium chloride 75 mL/hr Last Rate: 75 mL/hr (05/25/18 2337)   NPO Diet      Assessment/Plan      Active Hospital Problems (** Indicates Principal Problem)    Diagnosis Date Noted   • **Acute chest pain [R07.9] 05/25/2018   • History of migraine headaches [Z86.69]    • Low vitamin B12 level [E53.8]    • Anxiety and depression [F41.8]    • Asthma [J45.909]    • Acid reflux disease [K21.9]       Resolved Hospital Problems    Diagnosis Date Noted Date Resolved   No resolved problems to display.       · Typical and atypical chest pain: Etiology consulted.  Stress test today.  Ruled out for ACS.  Symptoms not completely consistent with PE but I will get d-dimer.  CTA chest if high  · MRI brain pending to rule out posterior CVA  · Mild headache and nausea have resolved    · If okay with cardiology and negativetests negative could potentially discharge home today        Ministerio Eddy MD  [facility]  05/26/18  8:40 AM

## 2018-05-26 NOTE — THERAPY EVALUATION
Acute Care - Speech Language Pathology NICU/PEDS  Initial Evaluation   [facility]       Patient Name: Sung Hernandez  : 1975  MRN: [id number]  Today's Date: 2018                   Admit Date: 2018       Visit Dx:      ICD-10-CM ICD-9-CM   1. Acute chest pain R07.9 786.50   2. Unsteady gait R26.81 781.2   3. Oropharyngeal dysphagia R13.12 787.22       Patient Active Problem List   Diagnosis   • Asthma   • Osteoarthritis of both knees   • Hiatal hernia   • Hearing loss   • Acid reflux disease   • Anxiety and depression   • History of migraine headaches   • Low vitamin B12 level   • Seasonal allergies   • Hx of migraines   • Acute chest pain        Past Medical History:   Diagnosis Date   • Acid reflux disease    • Anxiety and depression    • Asthma    • Hearing loss    • Hiatal hernia    • History of concussion    • History of migraine headaches    • Low vitamin B12 level    • Osteoarthritis of both knees    • Seasonal allergies         Past Surgical History:   Procedure Laterality Date   • APPENDECTOMY  ,   • GALLBLADDER SURGERY     • KIDNEY STONE SURGERY     • KNEE ARTHROTOMY Right 2016    Procedure: RT KNEE OPEN REMOVAL OSTEOCHONDROME W/ PERONEAL NERVE DECOMPRESSION;  Surgeon: HENRIETTA Chisholm MD;  Location: [facility] OR [facility];  Service:    • PATELLA SURGERY Left                   EDUCATION  The patient has been educated in the following areas:   Dysphagia (Swallowing Impairment).      SLP Recommendation and Plan                                                       SLP Outcome Measures (last 72 hours)      SLP Outcome Measures     Row Name 18 1500             SLP Outcome Measures    Outcome Measure Used? Adult NOMS  -LS         FCM Scores    FCM Chosen Swallowing  -LS      Swallowing FCM Score 7  -LS        User Key  (r) = Recorded By, (t) = Taken By, (c) = Cosigned By    Initials Name Effective Dates    LS Zohaib Denton MS CCC-SLP 18 -                Time  Calculation:         Therapy Charges for Today     Code Description Service Date Service Provider Modifiers Qty    25006582854 HC ST SWALLOWING CURRENT STATUS 5/26/2018 Zohaib Denton MS CCC-SLP GN, CH 1    30755947675 HC ST SWALLOWING PROJECTED 5/26/2018 Zohaib Denton MS CCC-SLP GN, CH 1    63993963800 HC ST SWALLOWING DISCHARGE 5/26/2018 Zohaib Denton MS CCC-SLP GN, CH 1    22731470473 HC ST EVAL ORAL PHARYNG SWALLOW 3 5/26/2018 Zohaib Denton MS CCC-SLP GN, KX 1            SLP G-Codes  SLP NOMS Used?: Yes  Functional Limitations: Swallowing  Swallow Current Status (): 0 percent impaired, limited or restricted  Swallow Goal Status (): 0 percent impaired, limited or restricted  Swallow Discharge Status (): 0 percent impaired, limited or restricted         Zohaib Denton MS CCC-SLP  5/26/2018

## 2018-05-26 NOTE — PLAN OF CARE
Problem: Stroke (Ischemic) (Adult)  Goal: Signs and Symptoms of Listed Potential Problems Will be Absent, Minimized or Managed (Stroke)  Outcome: Ongoing (interventions implemented as appropriate)   05/25/18 2346   Goal/Outcome Evaluation   Problems Assessed (Stroke (Ischemic)) all   Problems Assessed (Stroke (Ischemic)) none      05/25/18 2346   Goal/Outcome Evaluation   Problems Assessed (Stroke (Ischemic)) all   Problems Assessed (Stroke (Ischemic)) none

## 2018-05-26 NOTE — SIGNIFICANT NOTE
05/26/18 0952   Rehab Time/Intention   Evaluation Not Performed patient unavailable for evaluation  (Pt. unavailable. Pt. off the floor to Cardiology for echo and stress test per nursing. )   Rehab Treatment   Discipline occupational therapist

## 2018-05-26 NOTE — PLAN OF CARE
Problem: Patient Care Overview  Goal: Plan of Care Review   05/26/18 1520   Coping/Psychosocial   Plan of Care Reviewed With patient   OTHER   Outcome Summary Pt exhibits a functional swallow

## 2018-05-26 NOTE — SIGNIFICANT NOTE
05/26/18 1319   Rehab Time/Intention   Evaluation Not Performed (Pt observed ambulating in room, no gait disturbance or balance impairments noted. Pt reports no change in mobility compared to premorbid status. PT to sign off on pt at this time. Nsg notified. )   Rehab Treatment   Discipline physical therapist

## 2018-05-26 NOTE — DISCHARGE SUMMARY
Name: Sung Hernandez  Age: 43 y.o.  Sex: male  :  1975  MRN: [id number]         Primary Care Physician: ALYSON Buitrago      Date of Admission:  2018  Date of Discharge:  2018      CHIEF COMPLAINT  Chest Pain (pressure started yest)      DISCHARGE DIAGNOSIS  Active Hospital Problems (** Indicates Principal Problem)    Diagnosis Date Noted   • **Acute chest pain [R07.9] 2018   • History of migraine headaches [Z86.69]    • Low vitamin B12 level [E53.8]    • Anxiety and depression [F41.8]    • Asthma [J45.909]    • Acid reflux disease [K21.9]       Resolved Hospital Problems    Diagnosis Date Noted Date Resolved   No resolved problems to display.       SECONDARY DIAGNOSES  Past Medical History:   Diagnosis Date   • Acid reflux disease    • Anxiety and depression    • Asthma    • Hearing loss    • Hiatal hernia    • History of concussion    • History of migraine headaches    • Low vitamin B12 level    • Osteoarthritis of both knees    • Seasonal allergies        CONSULTS   Consult Orders (all)     Start     Ordered    18  Inpatient Cardiology Consult  Once     Specialty:  Cardiology  Provider:  Yaw Crow MD    18         PROCEDURES PERFORMED    STRESS TEST    Interpretation Summary   · Myocardial perfusion imaging indicates a normal myocardial perfusion study with no evidence of ischemia.  · Left ventricular ejection fraction is normal (Calculated EF = 61%).  · Impressions are consistent with a low risk study.     2D ECHO    Interpretation Summary   · Left ventricular systolic function is normal. Estimated EF = 61%.  · Normal left ventricular cavity size and wall thickness noted. All left ventricular wall segments contract normally.     MRI OF THE BRAIN WITHOUT CONTRAST.      IMPRESSION:  No acute intracranial pathology identified.     ----------------------------------------------------------------     MRI ANGIOGRAM HEAD - WITHOUT GADOLINIUM INTRAVENOUS  CONTRAST.     IMPRESSION:  No hemodynamically significant stenosis or aneurysm identified.      ----------------------------------------------------------------     MRI ANGIOGRAM NECK - WITHOUT AND WITH INTRAVENOUS CONTRAST.     IMPRESSION:    No hemodynamically significant stenoses are seen.  No significant NASCET  criteria stenosis in the bilateral proximal internal carotid arteries is  appreciated.     CT HEAD WITHOUT CONTRAST     IMPRESSION:  No acute process identified. Further evaluation could be  performed with MRI examination of the brain as indicated.       HOSPITAL COURSE    Patient is a pleasant previously healthy 43-year-old male who came in the hospital for chest pain.  Please see history and physical for complete details.  He was admitted to our service for further workup.  He also had this equilibrium and both cardiology and neurology was consulted.  He underwent a 2-D echo which was normal.  Stress test was also normal.  He was ruled out for acute Coronary syndrome.  He was also ruled out for stroke with MRI and MRA of his brain.  Neurology was consulted given the negative imaging studies did not see the patient.  He has returned completely back to normal today and wishes to be home.  He is medically stable since all of this test are negative he can be discharged home in improved condition.  I've asked him to follow-up with his primary care provider in one week for further workup and for hospital follow-up.    PHYSICAL EXAM  Temp:  [96.9 °F (36.1 °C)-98.8 °F (37.1 °C)] 98.8 °F (37.1 °C)  Heart Rate:  [68-98] 93  Resp:  [16-18] 18  BP: (127-146)/(61-93) 146/93  Body mass index is 32.28 kg/m².  Physical Exam  Constitutional: He is oriented to person, place, and time. No distress.   HENT:   Head: Normocephalic and atraumatic.   Neck: No JVD present.   Cardiovascular: Normal rate and regular rhythm.    No murmur heard.  Pulmonary/Chest: Effort normal and breath sounds normal. No stridor. No respiratory  distress.   Abdominal: Soft. Bowel sounds are normal. He exhibits no distension. There is no tenderness.   Musculoskeletal: He exhibits no edema or tenderness.   Neurological: He is alert and oriented to person, place, and time.   Skin: Skin is warm and dry. He is not diaphoretic. No erythema.   Psychiatric: He has a normal mood and affect. His behavior is normal.   Vitals reviewed.       CONDITION ON DISCHARGE  Stable.      DISCHARGE DISPOSITION   Home      ALLERGIES  No Known Allergies      DISCHARGE MEDICATIONS     Your medication list      CONTINUE taking these medications      Instructions Last Dose Given Next Dose Due   desvenlafaxine 50 MG 24 hr tablet  Commonly known as:  PRISTIQ      Take 50 mg by mouth 2 (Two) Times a Day.       EPINEPHrine 0.3 MG/0.3ML solution auto-injector injection  Commonly known as:  EPIPEN      Inject 0.3 mg under the skin As Needed.       fexofenadine 180 MG tablet  Commonly known as:  ALLEGRA      Take 180 mg by mouth Every Night.       Magnesium 250 MG tablet      Take 1 tablet by mouth 2 (Two) Times a Day.       montelukast 10 MG tablet  Commonly known as:  SINGULAIR      Take 10 mg by mouth every night.       omeprazole 40 MG capsule  Commonly known as:  priLOSEC      Take 40 mg by mouth Daily.       Potassium 99 MG tablet      Take 1 tablet by mouth daily.       PROAIR  (90 Base) MCG/ACT inhaler  Generic drug:  albuterol      Inhale 2 puffs Every 4 (Four) Hours As Needed.       traZODone 100 MG tablet  Commonly known as:  DESYREL      Take 150 mg by mouth Every Night.               Activity Instructions     Activity as Tolerated         Future Appointments  Date Time Provider Department Center   5/29/2018 11:00 AM ALYSON [address] None     Additional Instructions for the Follow-ups that You Need to Schedule     Call MD With Problems / Concerns    As directed        Follow-up Information     ALYSON Buitrago Follow up.    Specialty:  Family  Medicine  Contact information:  [address] [facility] [address] [phone]                   TEST  RESULTS PENDING AT DISCHARGE  None       CODE STATUS  Full Code        Ministerio Eddy MD  [facility]  05/26/18  3:03 PM      Time: greater than 30 minutes.

## 2018-05-29 ENCOUNTER — OFFICE VISIT (OUTPATIENT)
Dept: FAMILY MEDICINE CLINIC | Facility: CLINIC | Age: 43
End: 2018-05-29

## 2018-05-29 VITALS
OXYGEN SATURATION: 98 % | DIASTOLIC BLOOD PRESSURE: 88 MMHG | BODY MASS INDEX: 33.05 KG/M2 | HEART RATE: 83 BPM | HEIGHT: 72 IN | SYSTOLIC BLOOD PRESSURE: 120 MMHG | WEIGHT: 244 LBS

## 2018-05-29 DIAGNOSIS — Z09 HOSPITAL DISCHARGE FOLLOW-UP: Primary | ICD-10-CM

## 2018-05-29 PROCEDURE — 99213 OFFICE O/P EST LOW 20 MIN: CPT | Performed by: NURSE PRACTITIONER

## 2018-05-29 NOTE — PROGRESS NOTES
Sung Hernandez is a 43 y.o. male.Patient presents for a hospital follow up. He was seen at [facility] on 5/25/18 for SOB, chest pain, and bran fog. Symptoms occurred while working out. He feels better today, apart from nasal congestion.     Patient did start allergy injections 2-3 weeks ago. He sees [facility].  Is followed by psychiatrist,Dr. Baez.      Assessment/Plan   Problem List Items Addressed This Visit     None             No Follow-up on file.  There are no Patient Instructions on file for this visit.    No chief complaint on file.    Social History   Substance Use Topics   • Smoking status: Never Smoker   • Smokeless tobacco: Never Used   • Alcohol use 3.6 oz/week     3 Glasses of wine, 3 Shots of liquor per week      Comment: socially       History of Present Illness     The following portions of the patient's history were reviewed and updated as appropriate:PMHroutine: Social history , Allergies, Current Medications and Active Problem List    Review of Systems   HENT: Positive for congestion, sore throat and voice change.        Objective   There were no vitals filed for this visit.  There is no height or weight on file to calculate BMI.  Physical Exam   Constitutional: He is oriented to person, place, and time. He appears well-developed and well-nourished. No distress.   HENT:   Head: Normocephalic and atraumatic.   Right Ear: External ear normal.   Left Ear: External ear normal.   Mouth/Throat: Oropharynx is clear and moist.   Eyes: EOM are normal. Pupils are equal, round, and reactive to light.   Cardiovascular: Normal rate and regular rhythm.    Pulmonary/Chest: Effort normal.   Abdominal: Soft.   Musculoskeletal: Normal range of motion.   Neurological: He is alert and oriented to person, place, and time. He displays normal reflexes.   Skin: Skin is warm.   Nursing note and vitals reviewed.    Reviewed Data:  Admission on 05/25/2018, Discharged on 05/26/2018   No results  displayed because visit has over 200 results.        Will return for any problems

## 2018-12-11 ENCOUNTER — OFFICE VISIT (OUTPATIENT)
Dept: FAMILY MEDICINE CLINIC | Facility: CLINIC | Age: 43
End: 2018-12-11

## 2018-12-11 VITALS
BODY MASS INDEX: 32.51 KG/M2 | HEART RATE: 78 BPM | SYSTOLIC BLOOD PRESSURE: 120 MMHG | OXYGEN SATURATION: 98 % | HEIGHT: 72 IN | WEIGHT: 240 LBS | DIASTOLIC BLOOD PRESSURE: 80 MMHG

## 2018-12-11 DIAGNOSIS — Z00.00 PHYSICAL EXAM: Primary | ICD-10-CM

## 2018-12-11 DIAGNOSIS — R79.89 LOW TESTOSTERONE IN MALE: ICD-10-CM

## 2018-12-11 DIAGNOSIS — E34.9 TESTOSTERONE DEFICIENCY: ICD-10-CM

## 2018-12-11 DIAGNOSIS — I83.91 VARICOSE VEINS OF RIGHT LOWER EXTREMITY, UNSPECIFIED WHETHER COMPLICATED: ICD-10-CM

## 2018-12-11 DIAGNOSIS — R61 PERSPIRATION EXCESSIVE: ICD-10-CM

## 2018-12-11 PROCEDURE — 90471 IMMUNIZATION ADMIN: CPT | Performed by: NURSE PRACTITIONER

## 2018-12-11 PROCEDURE — 90472 IMMUNIZATION ADMIN EACH ADD: CPT | Performed by: NURSE PRACTITIONER

## 2018-12-11 PROCEDURE — 99396 PREV VISIT EST AGE 40-64: CPT | Performed by: NURSE PRACTITIONER

## 2018-12-11 PROCEDURE — 99212 OFFICE O/P EST SF 10 MIN: CPT | Performed by: NURSE PRACTITIONER

## 2018-12-11 PROCEDURE — 90632 HEPA VACCINE ADULT IM: CPT | Performed by: NURSE PRACTITIONER

## 2018-12-11 PROCEDURE — 90715 TDAP VACCINE 7 YRS/> IM: CPT | Performed by: NURSE PRACTITIONER

## 2018-12-11 NOTE — PROGRESS NOTES
Sung Hernandez is a 43 y.o. male.  C/O varicose veins in rt lower leg and sometimes it is painful,  He also in the last year he has been sweating more in his axillary region  H/O low testosterone and would like that checked  PMHX: seasonal allergies, gerd, anxiety and depression and managed well with his medications.  PsHX and PFHX were reviewed in chart and with pt.  Exercises frequently  Diet is well balanced.        Assessment/Plan   Problem List Items Addressed This Visit     None      Visit Diagnoses     Physical exam    -  Primary    Relevant Orders    Comprehensive Metabolic Panel (Completed)    Lipid Panel With / Chol / HDL Ratio (Completed)    Low testosterone in male        Testosterone deficiency        Varicose veins of right lower extremity, unspecified whether complicated        Relevant Orders    Ambulatory Referral to Vascular Surgery    Perspiration excessive        Relevant Orders    TSH (Completed)             No Follow-up on file.  There are no Patient Instructions on file for this visit.    Chief Complaint   Patient presents with   • Annual Exam     Social History     Tobacco Use   • Smoking status: Never Smoker   • Smokeless tobacco: Never Used   Substance Use Topics   • Alcohol use: Yes     Alcohol/week: 3.6 oz     Types: 3 Glasses of wine, 3 Shots of liquor per week     Comment: socially   • Drug use: Yes     Types: Marijuana       History of Present Illness     The following portions of the patient's history were reviewed and updated as appropriate:PMHroutine: Social history , Past Medical History, Surgical history , Allergies, Current Medications, Active Problem List, Family History and Health Maintenance    Review of Systems   Constitutional: Negative for activity change and appetite change.   Respiratory: Negative for shortness of breath.    Allergic/Immunologic: Positive for environmental allergies.   Neurological: Negative for dizziness and headaches.       Objective   Vitals:     "12/11/18 1318   BP: 120/80   Pulse: 78   SpO2: 98%   Weight: 109 kg (240 lb)   Height: 182.9 cm (72\")     Body mass index is 32.55 kg/m².  Physical Exam   Constitutional: He appears well-developed and well-nourished. No distress.   HENT:   Head: Normocephalic and atraumatic.   Right Ear: External ear normal.   Left Ear: External ear normal.   Eyes: EOM are normal. Pupils are equal, round, and reactive to light.   Neck: Neck supple.   Cardiovascular: Normal rate and regular rhythm.   Pulmonary/Chest: Effort normal and breath sounds normal.   Abdominal: Soft. Bowel sounds are normal.   Musculoskeletal: Normal range of motion.   Varicose veins to rt lower leg   Neurological: He is alert.   Nursing note and vitals reviewed.    Reviewed Data:  Office Visit on 12/11/2018   Component Date Value Ref Range Status   • Glucose 12/11/2018 85  65 - 99 mg/dL Final   • BUN 12/11/2018 16  6 - 24 mg/dL Final   • Creatinine 12/11/2018 1.30* 0.76 - 1.27 mg/dL Final   • eGFR Non  Am 12/11/2018 67  >59 mL/min/1.73 Final   • eGFR African Am 12/11/2018 77  >59 mL/min/1.73 Final   • BUN/Creatinine Ratio 12/11/2018 12  9 - 20 Final   • Sodium 12/11/2018 142  134 - 144 mmol/L Final   • Potassium 12/11/2018 5.0  3.5 - 5.2 mmol/L Final   • Chloride 12/11/2018 100  96 - 106 mmol/L Final   • Total CO2 12/11/2018 27  20 - 29 mmol/L Final   • Calcium 12/11/2018 9.9  8.7 - 10.2 mg/dL Final   • Total Protein 12/11/2018 7.4  6.0 - 8.5 g/dL Final   • Albumin 12/11/2018 4.9  3.5 - 5.5 g/dL Final   • Globulin 12/11/2018 2.5  1.5 - 4.5 g/dL Final   • A/G Ratio 12/11/2018 2.0  1.2 - 2.2 Final   • Total Bilirubin 12/11/2018 1.1  0.0 - 1.2 mg/dL Final   • Alkaline Phosphatase 12/11/2018 49  39 - 117 IU/L Final   • AST (SGOT) 12/11/2018 25  0 - 40 IU/L Final   • ALT (SGPT) 12/11/2018 25  0 - 44 IU/L Final   • Total Cholesterol 12/11/2018 156  100 - 199 mg/dL Final   • Triglycerides 12/11/2018 161* 0 - 149 mg/dL Final   • HDL Cholesterol 12/11/2018 41  " >39 mg/dL Final   • VLDL Cholesterol 12/11/2018 32  5 - 40 mg/dL Final   • LDL Cholesterol  12/11/2018 83  0 - 99 mg/dL Final   • Chol/HDL Ratio 12/11/2018 3.8  0.0 - 5.0 ratio Final    Comment:                                   T. Chol/HDL Ratio                                              Men  Women                                1/2 Avg.Risk  3.4    3.3                                    Avg.Risk  5.0    4.4                                 2X Avg.Risk  9.6    7.1                                 3X Avg.Risk 23.4   11.0     • TSH 12/11/2018 1.350  0.450 - 4.500 uIU/mL Final   • Testosterone, Total 12/11/2018 502  264 - 916 ng/dL Final    Comment: Adult male reference interval is based on a population of  healthy nonobese males (BMI <30) between 19 and 39 years old.  Bee et.al. JCEM 2017,102;4669-3806. PMID: 30457701.

## 2018-12-12 LAB
ALBUMIN SERPL-MCNC: 4.9 G/DL (ref 3.5–5.5)
ALBUMIN/GLOB SERPL: 2 {RATIO} (ref 1.2–2.2)
ALP SERPL-CCNC: 49 IU/L (ref 39–117)
ALT SERPL-CCNC: 25 IU/L (ref 0–44)
AST SERPL-CCNC: 25 IU/L (ref 0–40)
BILIRUB SERPL-MCNC: 1.1 MG/DL (ref 0–1.2)
BUN SERPL-MCNC: 16 MG/DL (ref 6–24)
BUN/CREAT SERPL: 12 (ref 9–20)
CALCIUM SERPL-MCNC: 9.9 MG/DL (ref 8.7–10.2)
CHLORIDE SERPL-SCNC: 100 MMOL/L (ref 96–106)
CHOLEST SERPL-MCNC: 156 MG/DL (ref 100–199)
CHOLEST/HDLC SERPL: 3.8 RATIO (ref 0–5)
CO2 SERPL-SCNC: 27 MMOL/L (ref 20–29)
CREAT SERPL-MCNC: 1.3 MG/DL (ref 0.76–1.27)
GLOBULIN SER CALC-MCNC: 2.5 G/DL (ref 1.5–4.5)
GLUCOSE SERPL-MCNC: 85 MG/DL (ref 65–99)
HDLC SERPL-MCNC: 41 MG/DL
LDLC SERPL CALC-MCNC: 83 MG/DL (ref 0–99)
POTASSIUM SERPL-SCNC: 5 MMOL/L (ref 3.5–5.2)
PROT SERPL-MCNC: 7.4 G/DL (ref 6–8.5)
SODIUM SERPL-SCNC: 142 MMOL/L (ref 134–144)
TESTOST SERPL-MCNC: 502 NG/DL (ref 264–916)
TRIGL SERPL-MCNC: 161 MG/DL (ref 0–149)
TSH SERPL DL<=0.005 MIU/L-ACNC: 1.35 UIU/ML (ref 0.45–4.5)
VLDLC SERPL CALC-MCNC: 32 MG/DL (ref 5–40)

## 2018-12-19 DIAGNOSIS — R79.89 ELEVATED SERUM CREATININE: Primary | ICD-10-CM

## 2018-12-24 ENCOUNTER — RESULTS ENCOUNTER (OUTPATIENT)
Dept: FAMILY MEDICINE CLINIC | Facility: CLINIC | Age: 43
End: 2018-12-24

## 2018-12-24 DIAGNOSIS — R79.89 ELEVATED SERUM CREATININE: ICD-10-CM

## 2018-12-27 ENCOUNTER — TELEPHONE (OUTPATIENT)
Dept: FAMILY MEDICINE CLINIC | Facility: CLINIC | Age: 43
End: 2018-12-27

## 2019-01-29 ENCOUNTER — OFFICE VISIT (OUTPATIENT)
Dept: FAMILY MEDICINE CLINIC | Facility: CLINIC | Age: 44
End: 2019-01-29

## 2019-01-29 VITALS
WEIGHT: 236 LBS | HEART RATE: 78 BPM | BODY MASS INDEX: 31.97 KG/M2 | HEIGHT: 72 IN | DIASTOLIC BLOOD PRESSURE: 70 MMHG | SYSTOLIC BLOOD PRESSURE: 108 MMHG | OXYGEN SATURATION: 99 %

## 2019-01-29 DIAGNOSIS — R94.4 ABNORMAL CREATININE CLEARANCE GLOMERULAR FILTRATION: ICD-10-CM

## 2019-01-29 DIAGNOSIS — M10.9 ACUTE GOUT, UNSPECIFIED CAUSE, UNSPECIFIED SITE: Primary | ICD-10-CM

## 2019-01-29 PROCEDURE — 99213 OFFICE O/P EST LOW 20 MIN: CPT | Performed by: NURSE PRACTITIONER

## 2019-01-29 RX ORDER — METHYLPREDNISOLONE 4 MG/1
TABLET ORAL
Qty: 1 EACH | Refills: 0 | Status: SHIPPED | OUTPATIENT
Start: 2019-01-29 | End: 2019-02-07 | Stop reason: SDUPTHER

## 2019-01-29 RX ORDER — UBIDECARENONE 75 MG
50 CAPSULE ORAL DAILY
COMMUNITY
End: 2021-03-02

## 2019-01-30 LAB
ALBUMIN SERPL-MCNC: 4.8 G/DL (ref 3.5–5.5)
ALBUMIN/GLOB SERPL: 2 {RATIO} (ref 1.2–2.2)
ALP SERPL-CCNC: 53 IU/L (ref 39–117)
ALT SERPL-CCNC: 20 IU/L (ref 0–44)
AST SERPL-CCNC: 18 IU/L (ref 0–40)
BILIRUB SERPL-MCNC: 1.2 MG/DL (ref 0–1.2)
BUN SERPL-MCNC: 15 MG/DL (ref 6–24)
BUN/CREAT SERPL: 15 (ref 9–20)
CALCIUM SERPL-MCNC: 10 MG/DL (ref 8.7–10.2)
CHLORIDE SERPL-SCNC: 100 MMOL/L (ref 96–106)
CO2 SERPL-SCNC: 25 MMOL/L (ref 20–29)
CREAT SERPL-MCNC: 1 MG/DL (ref 0.76–1.27)
GLOBULIN SER CALC-MCNC: 2.4 G/DL (ref 1.5–4.5)
GLUCOSE SERPL-MCNC: 88 MG/DL (ref 65–99)
POTASSIUM SERPL-SCNC: 4.5 MMOL/L (ref 3.5–5.2)
PROT SERPL-MCNC: 7.2 G/DL (ref 6–8.5)
SODIUM SERPL-SCNC: 142 MMOL/L (ref 134–144)
URATE SERPL-MCNC: 7.2 MG/DL (ref 3.7–8.6)

## 2019-02-07 RX ORDER — METHYLPREDNISOLONE 4 MG/1
TABLET ORAL
Qty: 1 EACH | Refills: 0 | Status: SHIPPED | OUTPATIENT
Start: 2019-02-07 | End: 2019-05-22

## 2019-05-22 ENCOUNTER — HOSPITAL ENCOUNTER (OUTPATIENT)
Dept: GENERAL RADIOLOGY | Facility: HOSPITAL | Age: 44
Discharge: HOME | End: 2019-05-22

## 2019-05-22 ENCOUNTER — TELEPHONE (OUTPATIENT)
Dept: FAMILY MEDICINE CLINIC | Facility: CLINIC | Age: 44
End: 2019-05-22

## 2019-05-22 ENCOUNTER — HOSPITAL ENCOUNTER (OUTPATIENT)
Dept: GENERAL RADIOLOGY | Facility: HOSPITAL | Age: 44
Discharge: HOME | End: 2019-05-22
Admitting: NURSE PRACTITIONER

## 2019-05-22 ENCOUNTER — OFFICE VISIT (OUTPATIENT)
Dept: FAMILY MEDICINE CLINIC | Facility: CLINIC | Age: 44
End: 2019-05-22

## 2019-05-22 VITALS
SYSTOLIC BLOOD PRESSURE: 110 MMHG | HEIGHT: 72 IN | OXYGEN SATURATION: 98 % | HEART RATE: 72 BPM | DIASTOLIC BLOOD PRESSURE: 80 MMHG | BODY MASS INDEX: 32.51 KG/M2 | WEIGHT: 240 LBS

## 2019-05-22 DIAGNOSIS — G89.29 CHRONIC LEFT SHOULDER PAIN: ICD-10-CM

## 2019-05-22 DIAGNOSIS — G89.29 CHRONIC LEFT SHOULDER PAIN: Primary | ICD-10-CM

## 2019-05-22 DIAGNOSIS — R53.83 FATIGUE, UNSPECIFIED TYPE: ICD-10-CM

## 2019-05-22 DIAGNOSIS — M25.512 CHRONIC LEFT SHOULDER PAIN: Primary | ICD-10-CM

## 2019-05-22 DIAGNOSIS — M25.552 BILATERAL HIP PAIN: Primary | ICD-10-CM

## 2019-05-22 DIAGNOSIS — M54.50 CHRONIC BILATERAL LOW BACK PAIN WITHOUT SCIATICA: ICD-10-CM

## 2019-05-22 DIAGNOSIS — M25.512 CHRONIC LEFT SHOULDER PAIN: ICD-10-CM

## 2019-05-22 DIAGNOSIS — M25.551 BILATERAL HIP PAIN: Primary | ICD-10-CM

## 2019-05-22 DIAGNOSIS — G89.29 CHRONIC BILATERAL LOW BACK PAIN WITHOUT SCIATICA: ICD-10-CM

## 2019-05-22 PROCEDURE — 99214 OFFICE O/P EST MOD 30 MIN: CPT | Performed by: NURSE PRACTITIONER

## 2019-05-22 PROCEDURE — 73030 X-RAY EXAM OF SHOULDER: CPT

## 2019-05-22 PROCEDURE — 72100 X-RAY EXAM L-S SPINE 2/3 VWS: CPT

## 2019-05-22 NOTE — PROGRESS NOTES
Subjective .  Sung Hernandez is a 44 y.o. male.   Back pain for a couple of months.  History of Present Illness   This pain can be severe at times. He does take Advil which helps some. He does have occasional tingling in the arms and hands  Also having some left shoulder pain.  The following portions of the patient's history were reviewed and updated as appropriate: allergies, current medications, past family history, past medical history, past social history, past surgical history and problem list.    Review of Systems   Constitutional: Negative for activity change and appetite change.   Musculoskeletal: Positive for back pain, myalgias and neck pain.   Neurological: Positive for numbness. Negative for weakness.       Objective   Physical Exam   Constitutional: He appears well-developed and well-nourished. No distress.   HENT:   Head: Normocephalic and atraumatic.   Mouth/Throat: Oropharynx is clear and moist.   Eyes: EOM are normal. Pupils are equal, round, and reactive to light.   Neck: Neck supple.   Cardiovascular: Normal rate and regular rhythm.   Pulmonary/Chest: Effort normal and breath sounds normal.   Musculoskeletal: Normal range of motion. He exhibits tenderness.   Pain with internal rotation to bilateral hips  Pain with extension of left arm above his head   Neurological: He is alert.   Nursing note and vitals reviewed.      Assessment/Plan   Bilateral hip pain      Chronic left shoulder pain  X-ray ordered    Fatigue  Cbc  cmp  crp  Rheumatoid factor    Chronic low back pain  X-ray of lumbar spine ordered

## 2019-05-23 LAB
ANA SER QL: NEGATIVE
B BURGDOR IGG+IGM SER-ACNC: <0.91 ISR (ref 0–0.9)
BASOPHILS # BLD AUTO: 0.03 10*3/MM3 (ref 0–0.2)
BASOPHILS NFR BLD AUTO: 0.4 % (ref 0–1.5)
CRP SERPL-MCNC: 0.07 MG/DL (ref 0–0.5)
EOSINOPHIL # BLD AUTO: 0.12 10*3/MM3 (ref 0–0.4)
EOSINOPHIL NFR BLD AUTO: 1.6 % (ref 0.3–6.2)
ERYTHROCYTE [DISTWIDTH] IN BLOOD BY AUTOMATED COUNT: 12.8 % (ref 12.3–15.4)
HCT VFR BLD AUTO: 52.1 % (ref 37.5–51)
HGB BLD-MCNC: 16.9 G/DL (ref 13–17.7)
IMM GRANULOCYTES # BLD AUTO: 0.04 10*3/MM3 (ref 0–0.05)
IMM GRANULOCYTES NFR BLD AUTO: 0.5 % (ref 0–0.5)
IRON SERPL-MCNC: 104 MCG/DL (ref 59–158)
LYMPHOCYTES # BLD AUTO: 2.52 10*3/MM3 (ref 0.7–3.1)
LYMPHOCYTES NFR BLD AUTO: 33.4 % (ref 19.6–45.3)
MCH RBC QN AUTO: 30.8 PG (ref 26.6–33)
MCHC RBC AUTO-ENTMCNC: 32.4 G/DL (ref 31.5–35.7)
MCV RBC AUTO: 95.1 FL (ref 79–97)
MONOCYTES # BLD AUTO: 0.66 10*3/MM3 (ref 0.1–0.9)
MONOCYTES NFR BLD AUTO: 8.7 % (ref 5–12)
NEUTROPHILS # BLD AUTO: 4.18 10*3/MM3 (ref 1.7–7)
NEUTROPHILS NFR BLD AUTO: 55.4 % (ref 42.7–76)
NRBC BLD AUTO-RTO: 0.1 /100 WBC (ref 0–0.2)
PLATELET # BLD AUTO: 167 10*3/MM3 (ref 140–450)
RBC # BLD AUTO: 5.48 10*6/MM3 (ref 4.14–5.8)
TSH SERPL DL<=0.005 MIU/L-ACNC: 1.97 MIU/ML (ref 0.27–4.2)
URATE SERPL-MCNC: 6.9 MG/DL (ref 3.4–7)
WBC # BLD AUTO: 7.55 10*3/MM3 (ref 3.4–10.8)

## 2019-05-28 LAB
Lab: NORMAL
SPECIMEN STATUS: NORMAL

## 2019-05-29 LAB — RHEUMATOID FACT SERPL-ACNC: <10 IU/ML (ref 0–13.9)

## 2019-06-17 ENCOUNTER — CLINICAL SUPPORT (OUTPATIENT)
Dept: FAMILY MEDICINE CLINIC | Facility: CLINIC | Age: 44
End: 2019-06-17

## 2019-06-17 DIAGNOSIS — Z23 NEED FOR VACCINATION: Primary | ICD-10-CM

## 2019-06-17 PROCEDURE — 90471 IMMUNIZATION ADMIN: CPT | Performed by: NURSE PRACTITIONER

## 2019-06-17 PROCEDURE — 90632 HEPA VACCINE ADULT IM: CPT | Performed by: NURSE PRACTITIONER

## 2020-07-15 ENCOUNTER — TELEMEDICINE (OUTPATIENT)
Dept: FAMILY MEDICINE CLINIC | Facility: CLINIC | Age: 45
End: 2020-07-15

## 2020-07-15 DIAGNOSIS — R05.9 COUGH: Primary | ICD-10-CM

## 2020-07-15 DIAGNOSIS — Z20.822 COVID-19 RULED OUT: ICD-10-CM

## 2020-07-15 PROCEDURE — 99213 OFFICE O/P EST LOW 20 MIN: CPT | Performed by: NURSE PRACTITIONER

## 2020-07-15 NOTE — PROGRESS NOTES
Subjective loss of smell and taste  Sung Hernandez is a 45 y.o. male.     History of Present Illness   Video visit  Had some confusion and trouble thinking, no fever but started coughing yesterday.He is concerned that he has COVID.  The following portions of the patient's history were reviewed and updated as appropriate: allergies, current medications, past family history, past medical history, past social history, past surgical history and problem list.    Review of Systems   Constitutional: Negative for activity change, appetite change and fever.   Respiratory: Positive for cough. Negative for shortness of breath.    Cardiovascular: Negative for chest pain and leg swelling.   Skin: Negative for rash.   Neurological: Positive for confusion.       Objective   Physical Exam   Constitutional: He is oriented to person, place, and time. He appears well-developed and well-nourished.   HENT:   Head: Normocephalic and atraumatic.   Eyes: Pupils are equal, round, and reactive to light. EOM are normal.   Pulmonary/Chest: Effort normal.   Musculoskeletal: Normal range of motion.   Neurological: He is alert and oriented to person, place, and time.   Skin: Skin is warm and dry.   Psychiatric: He has a normal mood and affect.   Nursing note and vitals reviewed.        Assessment/Plan   Diagnoses and all orders for this visit:    Cough    COVID-19 ruled out      The use of a video visit has been reviewed with the patient and verbal informed consent has been obtained.  I spent 15 minutes with the patient discussing his current symptoms and plan of care.

## 2020-12-08 ENCOUNTER — E-VISIT (OUTPATIENT)
Dept: FAMILY MEDICINE CLINIC | Facility: TELEHEALTH | Age: 45
End: 2020-12-08

## 2021-03-02 ENCOUNTER — OFFICE VISIT (OUTPATIENT)
Dept: FAMILY MEDICINE CLINIC | Facility: CLINIC | Age: 46
End: 2021-03-02

## 2021-03-02 VITALS
HEART RATE: 77 BPM | OXYGEN SATURATION: 98 % | RESPIRATION RATE: 14 BRPM | SYSTOLIC BLOOD PRESSURE: 126 MMHG | BODY MASS INDEX: 31.15 KG/M2 | HEIGHT: 72 IN | DIASTOLIC BLOOD PRESSURE: 72 MMHG | WEIGHT: 230 LBS

## 2021-03-02 DIAGNOSIS — H00.012 HORDEOLUM EXTERNUM OF RIGHT LOWER EYELID: Primary | ICD-10-CM

## 2021-03-02 PROCEDURE — 99213 OFFICE O/P EST LOW 20 MIN: CPT | Performed by: NURSE PRACTITIONER

## 2021-03-02 RX ORDER — MOXIFLOXACIN 5 MG/ML
1 SOLUTION/ DROPS OPHTHALMIC 3 TIMES DAILY
Qty: 3 ML | Refills: 0 | Status: CANCELLED | OUTPATIENT
Start: 2021-03-02

## 2021-03-02 NOTE — PROGRESS NOTES
Subjective   Sung Hernandez is a 46 y.o. male.   Eye Problem    History of Present Illness   Stye to rt lower lateral eye lid that started a couple of days ago.  He tried some warm compresses.  It is not causing him any visual disturbance.    The following portions of the patient's history were reviewed and updated as appropriate: allergies, current medications, past family history, past medical history, past social history, past surgical history and problem list.    Review of Systems   Constitutional: Negative for activity change, appetite change and fever.   Eyes: Negative for blurred vision, double vision, photophobia, pain, discharge, redness, itching and visual disturbance.        He has an internal stye with some redness and pain to his right lower lateral eyelid.   Respiratory: Negative for cough and shortness of breath.    Cardiovascular: Negative for chest pain and leg swelling.   Skin: Negative for rash.       Objective   Physical Exam  Vitals signs and nursing note reviewed.   Constitutional:       General: He is not in acute distress.     Appearance: He is well-developed.   HENT:      Head: Normocephalic and atraumatic.      Right Ear: Tympanic membrane and external ear normal.      Left Ear: Tympanic membrane and external ear normal.   Eyes:      Extraocular Movements: Extraocular movements intact.      Conjunctiva/sclera: Conjunctivae normal.      Pupils: Pupils are equal, round, and reactive to light.      Comments: He has a stye to his right lower lateral eyelid.  He reports it is somewhat painful   Neck:      Musculoskeletal: Neck supple.   Lymphadenopathy:      Cervical: No cervical adenopathy.   Skin:     General: Skin is warm and dry.   Neurological:      Mental Status: He is alert and oriented to person, place, and time.           Assessment/Plan   Problem List Items Addressed This Visit     None      Visit Diagnoses     Hordeolum externum of right lower eyelid    -  Primary        Vigamox  eyedrops 1 drop 3 times daily to right eye.  Warm compresses as discussed.  Was advised to call if it worsens.       No follow-ups on file.

## 2021-03-03 ENCOUNTER — TELEPHONE (OUTPATIENT)
Dept: FAMILY MEDICINE CLINIC | Facility: CLINIC | Age: 46
End: 2021-03-03

## 2021-03-03 RX ORDER — POLYMYXIN B SULFATE AND TRIMETHOPRIM 1; 10000 MG/ML; [USP'U]/ML
1 SOLUTION OPHTHALMIC EVERY 4 HOURS
Qty: 10 ML | Refills: 0 | Status: SHIPPED | OUTPATIENT
Start: 2021-03-03

## 2021-03-03 NOTE — TELEPHONE ENCOUNTER
Sung Hernandez (Self) [phone] (H)           [id number] [place], [place] - [address]  - [phone]  - [phone]   [phone]    PATIENT CALLED IN WANTING TO KNOW IF YOU COULD CALL IN MEDS FOR HIS STYE.     PLEASE ADVISE

## 2021-03-08 ENCOUNTER — TELEPHONE (OUTPATIENT)
Dept: FAMILY MEDICINE CLINIC | Facility: CLINIC | Age: 46
End: 2021-03-08

## 2021-03-08 DIAGNOSIS — H00.012 HORDEOLUM EXTERNUM OF RIGHT LOWER EYELID: Primary | ICD-10-CM

## 2021-03-08 NOTE — TELEPHONE ENCOUNTER
PATIENT IS CALLING IN TO UPDATE THAT SINCE HIS VISIT WITH [facility], THE STY ON HIS RIGHT EYE SEEMS TO HAVE GOTTEN WORSE WITH NO IMPROVEMENT.  PLEASE ADVISE ON WHAT THE NEXT STEP IS     [phone]

## 2021-04-02 ENCOUNTER — BULK ORDERING (OUTPATIENT)
Dept: CASE MANAGEMENT | Facility: OTHER | Age: 46
End: 2021-04-02

## 2021-04-02 DIAGNOSIS — Z23 IMMUNIZATION DUE: ICD-10-CM

## 2023-02-17 VITALS
RESPIRATION RATE: 23 BRPM | DIASTOLIC BLOOD PRESSURE: 67 MMHG | SYSTOLIC BLOOD PRESSURE: 137 MMHG | RESPIRATION RATE: 3 BRPM | HEART RATE: 84 BPM | HEART RATE: 71 BPM | DIASTOLIC BLOOD PRESSURE: 78 MMHG | SYSTOLIC BLOOD PRESSURE: 124 MMHG | RESPIRATION RATE: 8 BRPM | RESPIRATION RATE: 9 BRPM | OXYGEN SATURATION: 95 % | DIASTOLIC BLOOD PRESSURE: 77 MMHG | DIASTOLIC BLOOD PRESSURE: 84 MMHG | TEMPERATURE: 97.2 F | TEMPERATURE: 97.3 F | SYSTOLIC BLOOD PRESSURE: 150 MMHG | DIASTOLIC BLOOD PRESSURE: 97 MMHG | SYSTOLIC BLOOD PRESSURE: 155 MMHG | HEART RATE: 86 BPM | OXYGEN SATURATION: 97 % | DIASTOLIC BLOOD PRESSURE: 79 MMHG | OXYGEN SATURATION: 96 % | RESPIRATION RATE: 20 BRPM | SYSTOLIC BLOOD PRESSURE: 120 MMHG | HEART RATE: 74 BPM | SYSTOLIC BLOOD PRESSURE: 142 MMHG | RESPIRATION RATE: 12 BRPM | HEIGHT: 73 IN | RESPIRATION RATE: 18 BRPM | RESPIRATION RATE: 21 BRPM | OXYGEN SATURATION: 100 % | HEART RATE: 67 BPM | HEART RATE: 76 BPM | OXYGEN SATURATION: 99 % | DIASTOLIC BLOOD PRESSURE: 65 MMHG | HEART RATE: 85 BPM | SYSTOLIC BLOOD PRESSURE: 118 MMHG | SYSTOLIC BLOOD PRESSURE: 128 MMHG | DIASTOLIC BLOOD PRESSURE: 89 MMHG | SYSTOLIC BLOOD PRESSURE: 136 MMHG | DIASTOLIC BLOOD PRESSURE: 92 MMHG | WEIGHT: 246 LBS | DIASTOLIC BLOOD PRESSURE: 96 MMHG

## 2023-02-20 ENCOUNTER — AMBULATORY SURGICAL CENTER (AMBULATORY)
Dept: URBAN - METROPOLITAN AREA SURGERY 17 | Facility: SURGERY | Age: 48
End: 2023-02-20
Payer: COMMERCIAL

## 2023-02-20 DIAGNOSIS — Z12.11 ENCOUNTER FOR SCREENING FOR MALIGNANT NEOPLASM OF COLON: ICD-10-CM

## 2023-02-20 PROCEDURE — 45378 DIAGNOSTIC COLONOSCOPY: CPT | Mod: 33 | Performed by: INTERNAL MEDICINE

## 2024-08-14 ENCOUNTER — LAB (OUTPATIENT)
Dept: LAB | Facility: HOSPITAL | Age: 49
End: 2024-08-14
Payer: COMMERCIAL

## 2024-08-14 ENCOUNTER — HOSPITAL ENCOUNTER (OUTPATIENT)
Dept: CT IMAGING | Facility: HOSPITAL | Age: 49
Discharge: HOME | End: 2024-08-14
Admitting: INTERNAL MEDICINE
Payer: COMMERCIAL

## 2024-08-14 DIAGNOSIS — R61 DIAPHORESIS: ICD-10-CM

## 2024-08-14 DIAGNOSIS — R19.7 DIARRHEA, UNSPECIFIED TYPE: ICD-10-CM

## 2024-08-14 DIAGNOSIS — R10.30 LOWER ABDOMINAL PAIN: Primary | ICD-10-CM

## 2024-08-14 DIAGNOSIS — R10.30 LOWER ABDOMINAL PAIN: ICD-10-CM

## 2024-08-14 LAB
ALBUMIN SERPL-MCNC: 4.5 G/DL (ref 3.5–5.2)
ALBUMIN/GLOB SERPL: 1.7 G/DL
ALP SERPL-CCNC: 52 U/L (ref 39–117)
ALT SERPL W P-5'-P-CCNC: 22 U/L (ref 1–41)
ANION GAP SERPL CALCULATED.3IONS-SCNC: 10.9 MMOL/L (ref 5–15)
AST SERPL-CCNC: 22 U/L (ref 1–40)
BASOPHILS # BLD AUTO: 0.03 10*3/MM3 (ref 0–0.2)
BASOPHILS NFR BLD AUTO: 0.4 % (ref 0–1.5)
BILIRUB SERPL-MCNC: 1.1 MG/DL (ref 0–1.2)
BILIRUB UR QL STRIP: NEGATIVE
BUN SERPL-MCNC: 11 MG/DL (ref 6–20)
BUN/CREAT SERPL: 12.2 (ref 7–25)
CALCIUM SPEC-SCNC: 9.6 MG/DL (ref 8.6–10.5)
CHLORIDE SERPL-SCNC: 101 MMOL/L (ref 98–107)
CLARITY UR: CLEAR
CO2 SERPL-SCNC: 25.1 MMOL/L (ref 22–29)
COLOR UR: YELLOW
CREAT SERPL-MCNC: 0.9 MG/DL (ref 0.76–1.27)
DEPRECATED RDW RBC AUTO: 40.1 FL (ref 37–54)
EGFRCR SERPLBLD CKD-EPI 2021: 104.7 ML/MIN/1.73
EOSINOPHIL # BLD AUTO: 0.11 10*3/MM3 (ref 0–0.4)
EOSINOPHIL NFR BLD AUTO: 1.4 % (ref 0.3–6.2)
ERYTHROCYTE [DISTWIDTH] IN BLOOD BY AUTOMATED COUNT: 12.4 % (ref 12.3–15.4)
GLOBULIN UR ELPH-MCNC: 2.7 GM/DL
GLUCOSE SERPL-MCNC: 89 MG/DL (ref 65–99)
GLUCOSE UR STRIP-MCNC: NEGATIVE MG/DL
HCT VFR BLD AUTO: 49.1 % (ref 37.5–51)
HGB BLD-MCNC: 16.6 G/DL (ref 13–17.7)
HGB UR QL STRIP.AUTO: NEGATIVE
IMM GRANULOCYTES # BLD AUTO: 0.04 10*3/MM3 (ref 0–0.05)
IMM GRANULOCYTES NFR BLD AUTO: 0.5 % (ref 0–0.5)
KETONES UR QL STRIP: NEGATIVE
LEUKOCYTE ESTERASE UR QL STRIP.AUTO: NEGATIVE
LIPASE SERPL-CCNC: 22 U/L (ref 13–60)
LYMPHOCYTES # BLD AUTO: 2.24 10*3/MM3 (ref 0.7–3.1)
LYMPHOCYTES NFR BLD AUTO: 28 % (ref 19.6–45.3)
MCH RBC QN AUTO: 30.7 PG (ref 26.6–33)
MCHC RBC AUTO-ENTMCNC: 33.8 G/DL (ref 31.5–35.7)
MCV RBC AUTO: 90.9 FL (ref 79–97)
MONOCYTES # BLD AUTO: 0.75 10*3/MM3 (ref 0.1–0.9)
MONOCYTES NFR BLD AUTO: 9.4 % (ref 5–12)
NEUTROPHILS NFR BLD AUTO: 4.84 10*3/MM3 (ref 1.7–7)
NEUTROPHILS NFR BLD AUTO: 60.3 % (ref 42.7–76)
NITRITE UR QL STRIP: NEGATIVE
NRBC BLD AUTO-RTO: 0 /100 WBC (ref 0–0.2)
PH UR STRIP.AUTO: 6.5 [PH] (ref 5–8)
PLATELET # BLD AUTO: 159 10*3/MM3 (ref 140–450)
PMV BLD AUTO: 10.5 FL (ref 6–12)
POTASSIUM SERPL-SCNC: 4.3 MMOL/L (ref 3.5–5.2)
PROT SERPL-MCNC: 7.2 G/DL (ref 6–8.5)
PROT UR QL STRIP: NEGATIVE
RBC # BLD AUTO: 5.4 10*6/MM3 (ref 4.14–5.8)
SODIUM SERPL-SCNC: 137 MMOL/L (ref 136–145)
SP GR UR STRIP: >1.03 (ref 1–1.03)
UROBILINOGEN UR QL STRIP: ABNORMAL
WBC NRBC COR # BLD AUTO: 8.01 10*3/MM3 (ref 3.4–10.8)

## 2024-08-14 PROCEDURE — 81003 URINALYSIS AUTO W/O SCOPE: CPT | Performed by: INTERNAL MEDICINE

## 2024-08-14 PROCEDURE — 36415 COLL VENOUS BLD VENIPUNCTURE: CPT | Performed by: INTERNAL MEDICINE

## 2024-08-14 PROCEDURE — 85025 COMPLETE CBC W/AUTO DIFF WBC: CPT | Performed by: INTERNAL MEDICINE

## 2024-08-14 PROCEDURE — 25510000001 IOPAMIDOL PER 1 ML: Performed by: INTERNAL MEDICINE

## 2024-08-14 PROCEDURE — 80053 COMPREHEN METABOLIC PANEL: CPT | Performed by: INTERNAL MEDICINE

## 2024-08-14 PROCEDURE — 74177 CT ABD & PELVIS W/CONTRAST: CPT

## 2024-08-14 PROCEDURE — 83690 ASSAY OF LIPASE: CPT

## 2024-08-14 RX ADMIN — IOPAMIDOL 100 ML: 755 INJECTION, SOLUTION INTRAVENOUS at 15:50
